# Patient Record
Sex: FEMALE | Race: OTHER | Employment: OTHER | URBAN - METROPOLITAN AREA
[De-identification: names, ages, dates, MRNs, and addresses within clinical notes are randomized per-mention and may not be internally consistent; named-entity substitution may affect disease eponyms.]

---

## 2018-11-02 ENCOUNTER — HOSPITAL ENCOUNTER (INPATIENT)
Facility: HOSPITAL | Age: 78
LOS: 5 days | Discharge: NURSING, REHAB OR LONG-TERM CARE | DRG: 470 | End: 2018-11-07
Attending: ORTHOPAEDIC SURGERY | Admitting: ORTHOPAEDIC SURGERY
Payer: MEDICARE

## 2018-11-02 ENCOUNTER — APPOINTMENT (INPATIENT)
Dept: RADIOLOGY | Facility: HOSPITAL | Age: 78
DRG: 470 | End: 2018-11-02
Payer: MEDICARE

## 2018-11-02 DIAGNOSIS — C16.9 MALIGNANT NEOPLASM OF STOMACH, UNSPECIFIED LOCATION (HCC): ICD-10-CM

## 2018-11-02 DIAGNOSIS — I48.0 PAF (PAROXYSMAL ATRIAL FIBRILLATION) (HCC): ICD-10-CM

## 2018-11-02 DIAGNOSIS — K59.00 CONSTIPATION: ICD-10-CM

## 2018-11-02 DIAGNOSIS — S72.002D FRACTURE OF LEFT HIP REQUIRING OPERATIVE REPAIR, CLOSED, WITH ROUTINE HEALING, SUBSEQUENT ENCOUNTER: Primary | ICD-10-CM

## 2018-11-02 DIAGNOSIS — I48.91 ATRIAL FIBRILLATION (HCC): ICD-10-CM

## 2018-11-02 PROBLEM — S72.002A FRACTURE, HIP, LEFT, CLOSED, INITIAL ENCOUNTER (HCC): Status: ACTIVE | Noted: 2018-11-02

## 2018-11-02 PROBLEM — N32.81 OVERACTIVE BLADDER: Status: ACTIVE | Noted: 2018-11-02
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
## 2018-11-02 PROBLEM — I10 ESSENTIAL HYPERTENSION: Status: ACTIVE | Noted: 2018-11-02

## 2018-11-02 PROBLEM — R32 URINARY INCONTINENCE: Status: ACTIVE | Noted: 2018-11-02

## 2018-11-02 LAB
ANION GAP SERPL CALCULATED.3IONS-SCNC: 8 MMOL/L (ref 4–13)
BUN SERPL-MCNC: 17 MG/DL (ref 5–25)
CALCIUM SERPL-MCNC: 9 MG/DL (ref 8.3–10.1)
CHLORIDE SERPL-SCNC: 105 MMOL/L (ref 100–108)
CK SERPL-CCNC: 110 U/L (ref 26–192)
CO2 SERPL-SCNC: 28 MMOL/L (ref 21–32)
CREAT SERPL-MCNC: 0.93 MG/DL (ref 0.6–1.3)
ERYTHROCYTE [DISTWIDTH] IN BLOOD BY AUTOMATED COUNT: 13.8 % (ref 11.6–15.1)
GFR SERPL CREATININE-BSD FRML MDRD: 59 ML/MIN/1.73SQ M
GLUCOSE SERPL-MCNC: 139 MG/DL (ref 65–140)
HCT VFR BLD AUTO: 43.5 % (ref 34.8–46.1)
HGB BLD-MCNC: 13.7 G/DL (ref 11.5–15.4)
INR PPP: 0.99 (ref 0.86–1.16)
MCH RBC QN AUTO: 30.6 PG (ref 26.8–34.3)
MCHC RBC AUTO-ENTMCNC: 31.5 G/DL (ref 31.4–37.4)
MCV RBC AUTO: 97 FL (ref 82–98)
PLATELET # BLD AUTO: 141 THOUSANDS/UL (ref 149–390)
PMV BLD AUTO: 9.5 FL (ref 8.9–12.7)
POTASSIUM SERPL-SCNC: 4.2 MMOL/L (ref 3.5–5.3)
PROTHROMBIN TIME: 10.4 SECONDS (ref 9.4–11.7)
RBC # BLD AUTO: 4.47 MILLION/UL (ref 3.81–5.12)
SODIUM SERPL-SCNC: 141 MMOL/L (ref 136–145)
WBC # BLD AUTO: 8.05 THOUSAND/UL (ref 4.31–10.16)

## 2018-11-02 PROCEDURE — 99221 1ST HOSP IP/OBS SF/LOW 40: CPT | Performed by: STUDENT IN AN ORGANIZED HEALTH CARE EDUCATION/TRAINING PROGRAM

## 2018-11-02 PROCEDURE — 82550 ASSAY OF CK (CPK): CPT | Performed by: STUDENT IN AN ORGANIZED HEALTH CARE EDUCATION/TRAINING PROGRAM

## 2018-11-02 PROCEDURE — 93005 ELECTROCARDIOGRAM TRACING: CPT | Performed by: STUDENT IN AN ORGANIZED HEALTH CARE EDUCATION/TRAINING PROGRAM

## 2018-11-02 PROCEDURE — 1124F ACP DISCUSS-NO DSCNMKR DOCD: CPT | Performed by: INTERNAL MEDICINE

## 2018-11-02 PROCEDURE — 85610 PROTHROMBIN TIME: CPT | Performed by: PHYSICIAN ASSISTANT

## 2018-11-02 PROCEDURE — 85027 COMPLETE CBC AUTOMATED: CPT | Performed by: PHYSICIAN ASSISTANT

## 2018-11-02 PROCEDURE — 93005 ELECTROCARDIOGRAM TRACING: CPT

## 2018-11-02 PROCEDURE — 87081 CULTURE SCREEN ONLY: CPT | Performed by: PHYSICIAN ASSISTANT

## 2018-11-02 PROCEDURE — 80048 BASIC METABOLIC PNL TOTAL CA: CPT | Performed by: PHYSICIAN ASSISTANT

## 2018-11-02 PROCEDURE — 71045 X-RAY EXAM CHEST 1 VIEW: CPT

## 2018-11-02 RX ORDER — OXYBUTYNIN CHLORIDE 5 MG/1
10 TABLET, EXTENDED RELEASE ORAL DAILY
Status: DISCONTINUED | OUTPATIENT
Start: 2018-11-03 | End: 2018-11-07 | Stop reason: HOSPADM

## 2018-11-02 RX ORDER — ACETAMINOPHEN 325 MG/1
650 TABLET ORAL EVERY 6 HOURS PRN
Status: DISCONTINUED | OUTPATIENT
Start: 2018-11-02 | End: 2018-11-07 | Stop reason: HOSPADM

## 2018-11-02 RX ORDER — SOLIFENACIN SUCCINATE 10 MG/1
TABLET, FILM COATED ORAL DAILY
COMMUNITY
End: 2021-06-21 | Stop reason: SDUPTHER

## 2018-11-02 RX ORDER — HEPARIN SODIUM 5000 [USP'U]/ML
5000 INJECTION, SOLUTION INTRAVENOUS; SUBCUTANEOUS EVERY 8 HOURS SCHEDULED
Status: DISCONTINUED | OUTPATIENT
Start: 2018-11-02 | End: 2018-11-03

## 2018-11-02 RX ORDER — DOCUSATE SODIUM 100 MG/1
100 CAPSULE, LIQUID FILLED ORAL 2 TIMES DAILY
Status: DISCONTINUED | OUTPATIENT
Start: 2018-11-02 | End: 2018-11-07 | Stop reason: HOSPADM

## 2018-11-02 RX ORDER — METOPROLOL SUCCINATE 50 MG/1
50 TABLET, EXTENDED RELEASE ORAL DAILY
Status: DISCONTINUED | OUTPATIENT
Start: 2018-11-03 | End: 2018-11-07 | Stop reason: HOSPADM

## 2018-11-02 RX ORDER — METOPROLOL SUCCINATE 50 MG/1
50 TABLET, EXTENDED RELEASE ORAL DAILY
COMMUNITY

## 2018-11-02 RX ORDER — ONDANSETRON 2 MG/ML
4 INJECTION INTRAMUSCULAR; INTRAVENOUS EVERY 6 HOURS PRN
Status: DISCONTINUED | OUTPATIENT
Start: 2018-11-02 | End: 2018-11-07 | Stop reason: HOSPADM

## 2018-11-02 RX ORDER — HYDROMORPHONE HCL/PF 1 MG/ML
1 SYRINGE (ML) INJECTION
Status: DISCONTINUED | OUTPATIENT
Start: 2018-11-02 | End: 2018-11-06

## 2018-11-02 RX ORDER — ACETAMINOPHEN 500 MG
500 TABLET ORAL EVERY 6 HOURS PRN
COMMUNITY

## 2018-11-02 RX ORDER — SODIUM CHLORIDE, SODIUM LACTATE, POTASSIUM CHLORIDE, CALCIUM CHLORIDE 600; 310; 30; 20 MG/100ML; MG/100ML; MG/100ML; MG/100ML
85 INJECTION, SOLUTION INTRAVENOUS CONTINUOUS
Status: DISCONTINUED | OUTPATIENT
Start: 2018-11-02 | End: 2018-11-03

## 2018-11-02 RX ADMIN — HEPARIN SODIUM 5000 UNITS: 5000 INJECTION, SOLUTION INTRAVENOUS; SUBCUTANEOUS at 21:06

## 2018-11-02 RX ADMIN — ACETAMINOPHEN 650 MG: 325 TABLET, FILM COATED ORAL at 21:06

## 2018-11-02 RX ADMIN — SODIUM CHLORIDE, SODIUM LACTATE, POTASSIUM CHLORIDE, AND CALCIUM CHLORIDE 85 ML/HR: .6; .31; .03; .02 INJECTION, SOLUTION INTRAVENOUS at 19:12

## 2018-11-02 RX ADMIN — DOCUSATE SODIUM 100 MG: 100 CAPSULE, LIQUID FILLED ORAL at 19:12

## 2018-11-02 NOTE — ASSESSMENT & PLAN NOTE
· Pt on metoprolol  · BP elevated on arrival, may be 2/2 pain  · Cont home med with holding parameters  · monitor

## 2018-11-02 NOTE — H&P
H&P-ORTHO      Patient: Sheyla Erickson   : 1940 Sex: female   MRN: [id number]     CSN: [id number]      History of Present Illness   HPI:  Sheyla Erickson is a 77 y o  female who presents with left hip s/p fall yesterday at home  She reports she missed the last step on her stairs, falling on her hip and buttock  She had an immediate onset of pain and was unable to walk  She was taken to [facility] where xrays showed a left hip subcapital femoral neck fracture  The patient has a long standing history with Dr Joyce Jones, including right TKA in 10/2016, and recent treatment for left knee OA with visco injections  She therefore opted to transfer to [place] so that he would be able to perform her surgery  She reports moderate pain at this time, worse with movement  No other orthopedic complaints  Review of Systems:   Constitutional:  Negative for activity change, fever, chills and diaphoresis  HENT: Negative for hearing loss and trouble swallowing  Eyes: Negative for visual disturbance  Respiratory: Negative for chest tightness and shortness of breath  Cardiovascular: Negative for chest pain  Gastrointestinal: Negative  abdominal pain, diarrhea, nausea and vomiting  Genitourinary: Negative for urinary issues   Neurological: Negative for dizziness and headaches         Historical Information   Past Medical History:   Diagnosis Date    Arthritis     Cancer of stomach ([address] ) 2013    s/p radiation     Hypertension     overactive bladder      Past Surgical History:   Procedure Laterality Date    APPENDECTOMY      REPLACEMENT TOTAL KNEE Right 2016     Social History   History   Alcohol Use No     History   Drug Use No     History   Smoking Status    Never Smoker   Smokeless Tobacco    Never Used     Family History:   Family History   Problem Relation Age of Onset    Cancer Maternal Grandfather        Meds/Allergies   Prescriptions Prior to Admission   Medication    acetaminophen (TYLENOL) 500 mg tablet    metoprolol succinate (TOPROL-XL) 50 mg 24 hr tablet    solifenacin (VESICARE) 10 MG tablet     No Known Allergies    Objective   Vitals: /76 (BP Location: Left arm)   Pulse 76   Temp 100 °F (37 8 °C) (Oral)   Resp 18   Ht 5' 4" (1 626 m)   SpO2 90%   Breastfeeding? No   BMI 24 03 kg/m²     Physical Exam:  General Alert awake and alert  Normocephalic atraumatic PERRLA  Lungs clear bilaterally  Cardiac normal S1 normal S2  Abdomen soft, nontender  ORTHO Left hip- moderate swelling, tenderness to palpation, skin intact  Left knee no tenderness of jointline,  Sensation intact  Calf soft and nontender  Palpable pulses, foot warm to touch    No intake/output data recorded  Invasive Devices     Peripheral Intravenous Line            Peripheral IV Right Hand -- days                    Lab Results: CBC:   Lab Results   Component Value Date    WBC 8 05 11/02/2018    HGB 13 7 11/02/2018    HCT 43 5 11/02/2018    MCV 97 11/02/2018     (L) 11/02/2018    MCH 30 6 11/02/2018    MCHC 31 5 11/02/2018    RDW 13 8 11/02/2018    MPV 9 5 11/02/2018           Assessment/ Plan:  Left hip femoral neck fracture ( xrays reviewed by Dr Renee Edmonds from [place])  That patient has been transferred to [facility] for continuity of care with her known orthopedic surgeon, Dr Krystal Mcintosh for surgery tomorrow, left hip bipolar   Risks and benefits were discussed with the patient and consent obtained by Dr Renee Edmonds  Currently the nursing team is trying to establish IV access  NPO after midnight, bedrest  Will hold sub q heparin tomorrow for surgery       Jeremie Byrd PA-C

## 2018-11-02 NOTE — ASSESSMENT & PLAN NOTE
· Hx of stomach ca s/p radiation in [place] in 2013  · Has not had follow up since   · Advised to follow up with her oncologist once acute issues resolve

## 2018-11-02 NOTE — CONSULTS
Inpatient Medical Consultation - [facility]    Patient Information: Ashley Ruiz 77 y o  female MRN: [id number]  Unit/Bed#: [address] [place] [address] Encounter: [id number]    PCP: No primary care provider on file  Date of Admission:  11/2/2018  Date of Consultation: 11/02/18  Requesting Physician: Loraine Unger MD      Consults    * Fracture, hip, left, closed, initial encounter [facility])   Assessment & Plan    · Patient transferred from [address] [place] [address] at her request to be operated on by Dr Tatiana Pal  · S/p mechanical fall down the stairs  · Left hip XR from OSH: Displaced, comminuted, subcapital fracture of the left femur  · Plan for OR tomorrow morning  · NPO at [facility]  · Pain control per primary team  · NWT, bedrest  · Post op PT/PT       Stomach cancer ([facility] [address] )   Assessment & Plan    · Hx of stomach ca s/p radiation in [place] in [place]  · Has not had follow up since   · Advised to follow up with her oncologist once acute issues resolve     Urinary incontinence   Assessment & Plan    · Substitute vesic are for formulary Ditropan     Essential hypertension   Assessment & Plan    · Pt on metoprolol  · BP elevated on arrival, may be 2/2 pain  · Cont home med with holding parameters  · monitor             Reason For Consultation:     Medical management    of Present Illness:    Ashley Ruiz is a 77 y o  female with a PMH of HTN, OA s/p right knee replacement, overactive bladder, stomach cancer s/p radiation in 2013  who is originally admitted to the Orthopedic service on 11/2/2018 due to left hip fracture  We are consulted for medical management  Yesterday patient was walking down the stairs and thought she was on the last step but was not so she lost her footing and fell, landing on her left side on a concrete floor  She could not get up because of pain and inability to bear weight  She laid on the floor for 2 5 hours until someone came home and called EMS   She was taken to [address] [place] [address] where she was admitted  She was planned to have surgery there but she requested to specifically have Dr Arlen Luna perform her surgery and this could not be done there so she was accepted by him and transferred here for treatment  Currently she has minimal pain if laying still, pain increases significantly if she is moved  Otherwise she has no complaints  She lives in a 2 story house with her , and she can care for for all her ADLs independently  She has never had an MI or stroke and has never been Dx with heart failure  She can walk up 2 flights of stairs without symptoms  She denies any chest pain, SOB, palpitations with exertion  Review of Systems:    Review of Systems   Constitutional: Negative for activity change, appetite change, chills, fatigue, fever and unexpected weight change  HENT: Negative  Eyes: Negative  Respiratory: Negative for cough, chest tightness, shortness of breath and wheezing  Cardiovascular: Negative for chest pain, palpitations and leg swelling  Gastrointestinal: Negative for abdominal pain, blood in stool, constipation, diarrhea, nausea and vomiting  Genitourinary: Negative for dysuria and flank pain  Hx urine incontinence   Musculoskeletal: Positive for arthralgias and gait problem  Skin: Negative for wound  Neurological: Negative for dizziness, seizures, syncope, weakness, light-headedness and headaches  Hematological: Negative  Psychiatric/Behavioral: Negative  All other systems reviewed and are negative  Past Medical and Surgical History:     Past Medical History:   Diagnosis Date    Arthritis     Cancer of stomach ([facility] [address] ) 2013    s/p radiation     Hypertension     Urinary incontinence        Past Surgical History:   Procedure Laterality Date    APPENDECTOMY      REPLACEMENT TOTAL KNEE Right 2016       Meds/Allergies:    PTA meds:   Prior to Admission Medications   Prescriptions Last Dose Informant Patient Reported?  Taking?   acetaminophen (TYLENOL) 500 mg tablet  Self Yes Yes   Sig: Take 500 mg by mouth every 6 (six) hours as needed for mild pain   metoprolol succinate (TOPROL-XL) 50 mg 24 hr tablet  Self Yes Yes   Sig: Take 50 mg by mouth daily   solifenacin (VESICARE) 10 MG tablet  Self Yes Yes   Sig: Take by mouth daily      Facility-Administered Medications: None       Allergies: No Known Allergies  History:     Marital Status: Unknown    Substance Use History:   History   Alcohol Use No     History   Smoking Status    Never Smoker   Smokeless Tobacco    Never Used     History   Drug Use No       Family History:    Family History   Problem Relation Age of Onset    Cancer Maternal Grandfather        Physical Exam:     Vitals:   Blood Pressure: 170/76 (11/02/18 1700)  Pulse: 76 (11/02/18 1700)  Temperature: 100 °F (37 8 °C) (11/02/18 1700)  Temp Source: Oral (11/02/18 1700)  Respirations: 18 (11/02/18 1700)  SpO2: 90 % (11/02/18 1700)    Physical Exam   Constitutional: She is oriented to person, place, and time  She appears well-developed  No distress  HENT:   Head: Normocephalic and atraumatic  Cardiovascular: Normal rate, regular rhythm and normal heart sounds  Pulmonary/Chest: Effort normal and breath sounds normal  No respiratory distress  She has no wheezes  She has no rales  Abdominal: Soft  Bowel sounds are normal  She exhibits no distension  There is no tenderness  There is no rebound and no guarding  Musculoskeletal: She exhibits no edema, tenderness or deformity  Neurological: She is alert and oriented to person, place, and time  Skin: Skin is warm and dry  Psychiatric: She has a normal mood and affect  Her behavior is normal    Nursing note and vitals reviewed  Lab Results: I Have Reviewed All Lab Data Below: Invalid input(s): LABALBU        * Additional Pertinent Lab Tests Reviewed: Labs Pending At The Time Of This Note    Imaging: I have personally reviewed pertinent reports        Outside hospital reports:  · Left hip XR: Displaced, comminuted, subcapital fracture of the left femur  · Left knee XR: severe OA which has progressed  Chondrocalcinosis suggesting CPPD  Probable bone infarcts within the distal left femur  · CXR:hyperinflated lungs  Probable scarring of left base      VTE Prophylaxis: Heparin  / sequential compression device       Counseling / Coordination of Care Time: 45 minutes  Greater than 50% of total time spent on patient counseling and coordination of care  Collaboration of Care:  Were Recommendations Directly Discussed with Primary Treatment Team? - Yes     ** Please Note: Dragon 360 Dictation speech to text software may have been used in the creation of this document **

## 2018-11-02 NOTE — ASSESSMENT & PLAN NOTE
· Patient transferred from [address] [place] [address] at her request to be operated on by Dr Estefani Esteban  · S/p mechanical fall down the stairs  · Left hip XR from OSH: Displaced, comminuted, subcapital fracture of the left femur  · Plan for OR tomorrow morning  · NPO at [facility]  · Pain control per primary team  · NWT, bedrest  · Pre op: CBC, BMP, PT/INR, CXR and EKG ordered  · Post op PT/PT

## 2018-11-03 ENCOUNTER — ANESTHESIA (INPATIENT)
Dept: PERIOP | Facility: HOSPITAL | Age: 78
DRG: 470 | End: 2018-11-03
Payer: MEDICARE

## 2018-11-03 ENCOUNTER — ANESTHESIA EVENT (INPATIENT)
Dept: PERIOP | Facility: HOSPITAL | Age: 78
DRG: 470 | End: 2018-11-03
Payer: MEDICARE

## 2018-11-03 ENCOUNTER — APPOINTMENT (INPATIENT)
Dept: NON INVASIVE DIAGNOSTICS | Facility: HOSPITAL | Age: 78
DRG: 470 | End: 2018-11-03
Payer: MEDICARE

## 2018-11-03 ENCOUNTER — APPOINTMENT (INPATIENT)
Dept: RADIOLOGY | Facility: HOSPITAL | Age: 78
DRG: 470 | End: 2018-11-03
Payer: MEDICARE

## 2018-11-03 PROBLEM — I48.0 PAF (PAROXYSMAL ATRIAL FIBRILLATION) (HCC): Status: ACTIVE | Noted: 2018-11-03

## 2018-11-03 PROBLEM — S72.002D: Status: ACTIVE | Noted: 2018-11-02

## 2018-11-03 PROCEDURE — 99232 SBSQ HOSP IP/OBS MODERATE 35: CPT | Performed by: STUDENT IN AN ORGANIZED HEALTH CARE EDUCATION/TRAINING PROGRAM

## 2018-11-03 PROCEDURE — C1776 JOINT DEVICE (IMPLANTABLE): HCPCS | Performed by: ORTHOPAEDIC SURGERY

## 2018-11-03 PROCEDURE — 93306 TTE W/DOPPLER COMPLETE: CPT | Performed by: INTERNAL MEDICINE

## 2018-11-03 PROCEDURE — 93306 TTE W/DOPPLER COMPLETE: CPT

## 2018-11-03 PROCEDURE — 99223 1ST HOSP IP/OBS HIGH 75: CPT | Performed by: INTERNAL MEDICINE

## 2018-11-03 PROCEDURE — 0SRS0J9 REPLACEMENT OF LEFT HIP JOINT, FEMORAL SURFACE WITH SYNTHETIC SUBSTITUTE, CEMENTED, OPEN APPROACH: ICD-10-PCS | Performed by: ORTHOPAEDIC SURGERY

## 2018-11-03 PROCEDURE — 73501 X-RAY EXAM HIP UNI 1 VIEW: CPT

## 2018-11-03 PROCEDURE — C1713 ANCHOR/SCREW BN/BN,TIS/BN: HCPCS | Performed by: ORTHOPAEDIC SURGERY

## 2018-11-03 DEVICE — CONQUEST FX FEMORAL COMPONENT SIZE 11
Type: IMPLANTABLE DEVICE | Site: HIP | Status: FUNCTIONAL
Brand: CONQUEST FX

## 2018-11-03 DEVICE — INVIS DISTAL CENTRALIZER SIZE 12MM
Type: IMPLANTABLE DEVICE | Site: HIP | Status: FUNCTIONAL
Brand: INVIS

## 2018-11-03 DEVICE — COBALT CHROME 12/14 TAPER FEMORAL                                    HEAD 28MM + 0: Type: IMPLANTABLE DEVICE | Site: HIP | Status: FUNCTIONAL

## 2018-11-03 DEVICE — TANDEM BIPOLAR COBALT CHROME 45MM                                    OUTER DIAMETER 28MM INNER DIAMETER
Type: IMPLANTABLE DEVICE | Site: HIP | Status: FUNCTIONAL
Brand: TANDEM

## 2018-11-03 DEVICE — VERSABOND AB 40 GRAMS FORMULATION 2
Type: IMPLANTABLE DEVICE | Site: HIP | Status: FUNCTIONAL
Brand: VERSABOND

## 2018-11-03 RX ORDER — ASPIRIN 81 MG/1
81 TABLET ORAL 2 TIMES DAILY
Status: DISCONTINUED | OUTPATIENT
Start: 2018-11-03 | End: 2018-11-07 | Stop reason: HOSPADM

## 2018-11-03 RX ORDER — ONDANSETRON 2 MG/ML
4 INJECTION INTRAMUSCULAR; INTRAVENOUS ONCE AS NEEDED
Status: DISCONTINUED | OUTPATIENT
Start: 2018-11-03 | End: 2018-11-06

## 2018-11-03 RX ORDER — KETOROLAC TROMETHAMINE 30 MG/ML
INJECTION, SOLUTION INTRAMUSCULAR; INTRAVENOUS AS NEEDED
Status: DISCONTINUED | OUTPATIENT
Start: 2018-11-03 | End: 2018-11-03 | Stop reason: SURG

## 2018-11-03 RX ORDER — SENNOSIDES 8.6 MG
1 TABLET ORAL
Status: DISCONTINUED | OUTPATIENT
Start: 2018-11-03 | End: 2018-11-07 | Stop reason: HOSPADM

## 2018-11-03 RX ORDER — DEXAMETHASONE SODIUM PHOSPHATE 4 MG/ML
INJECTION, SOLUTION INTRA-ARTICULAR; INTRALESIONAL; INTRAMUSCULAR; INTRAVENOUS; SOFT TISSUE AS NEEDED
Status: DISCONTINUED | OUTPATIENT
Start: 2018-11-03 | End: 2018-11-03 | Stop reason: SURG

## 2018-11-03 RX ORDER — GLYCOPYRROLATE 0.2 MG/ML
INJECTION INTRAMUSCULAR; INTRAVENOUS AS NEEDED
Status: DISCONTINUED | OUTPATIENT
Start: 2018-11-03 | End: 2018-11-03 | Stop reason: SURG

## 2018-11-03 RX ORDER — MAGNESIUM HYDROXIDE 1200 MG/15ML
LIQUID ORAL AS NEEDED
Status: DISCONTINUED | OUTPATIENT
Start: 2018-11-03 | End: 2018-11-03 | Stop reason: HOSPADM

## 2018-11-03 RX ORDER — OXYCODONE HYDROCHLORIDE AND ACETAMINOPHEN 5; 325 MG/1; MG/1
2 TABLET ORAL EVERY 4 HOURS PRN
Status: DISCONTINUED | OUTPATIENT
Start: 2018-11-03 | End: 2018-11-06

## 2018-11-03 RX ORDER — HYDROMORPHONE HYDROCHLORIDE 2 MG/ML
INJECTION, SOLUTION INTRAMUSCULAR; INTRAVENOUS; SUBCUTANEOUS AS NEEDED
Status: DISCONTINUED | OUTPATIENT
Start: 2018-11-03 | End: 2018-11-03 | Stop reason: SURG

## 2018-11-03 RX ORDER — OXYCODONE HYDROCHLORIDE AND ACETAMINOPHEN 5; 325 MG/1; MG/1
1 TABLET ORAL
Status: DISCONTINUED | OUTPATIENT
Start: 2018-11-03 | End: 2018-11-07 | Stop reason: HOSPADM

## 2018-11-03 RX ORDER — SODIUM CHLORIDE, SODIUM LACTATE, POTASSIUM CHLORIDE, CALCIUM CHLORIDE 600; 310; 30; 20 MG/100ML; MG/100ML; MG/100ML; MG/100ML
75 INJECTION, SOLUTION INTRAVENOUS CONTINUOUS
Status: DISCONTINUED | OUTPATIENT
Start: 2018-11-03 | End: 2018-11-04

## 2018-11-03 RX ORDER — EPHEDRINE SULFATE 50 MG/ML
INJECTION, SOLUTION INTRAVENOUS AS NEEDED
Status: DISCONTINUED | OUTPATIENT
Start: 2018-11-03 | End: 2018-11-03 | Stop reason: SURG

## 2018-11-03 RX ORDER — ONDANSETRON 2 MG/ML
INJECTION INTRAMUSCULAR; INTRAVENOUS AS NEEDED
Status: DISCONTINUED | OUTPATIENT
Start: 2018-11-03 | End: 2018-11-03 | Stop reason: SURG

## 2018-11-03 RX ORDER — FENTANYL CITRATE 50 UG/ML
INJECTION, SOLUTION INTRAMUSCULAR; INTRAVENOUS AS NEEDED
Status: DISCONTINUED | OUTPATIENT
Start: 2018-11-03 | End: 2018-11-03 | Stop reason: SURG

## 2018-11-03 RX ORDER — PROPOFOL 10 MG/ML
INJECTION, EMULSION INTRAVENOUS AS NEEDED
Status: DISCONTINUED | OUTPATIENT
Start: 2018-11-03 | End: 2018-11-03 | Stop reason: SURG

## 2018-11-03 RX ORDER — MAGNESIUM HYDROXIDE/ALUMINUM HYDROXICE/SIMETHICONE 120; 1200; 1200 MG/30ML; MG/30ML; MG/30ML
30 SUSPENSION ORAL EVERY 6 HOURS PRN
Status: DISCONTINUED | OUTPATIENT
Start: 2018-11-03 | End: 2018-11-07 | Stop reason: HOSPADM

## 2018-11-03 RX ORDER — FENTANYL CITRATE/PF 50 MCG/ML
25 SYRINGE (ML) INJECTION
Status: DISCONTINUED | OUTPATIENT
Start: 2018-11-03 | End: 2018-11-06

## 2018-11-03 RX ORDER — ROCURONIUM BROMIDE 10 MG/ML
INJECTION, SOLUTION INTRAVENOUS AS NEEDED
Status: DISCONTINUED | OUTPATIENT
Start: 2018-11-03 | End: 2018-11-03 | Stop reason: SURG

## 2018-11-03 RX ADMIN — ACETAMINOPHEN 650 MG: 325 TABLET, FILM COATED ORAL at 10:36

## 2018-11-03 RX ADMIN — HYDROMORPHONE HYDROCHLORIDE 0.5 MG: 2 INJECTION, SOLUTION INTRAMUSCULAR; INTRAVENOUS; SUBCUTANEOUS at 11:52

## 2018-11-03 RX ADMIN — KETOROLAC TROMETHAMINE 30 MG: 30 INJECTION, SOLUTION INTRAMUSCULAR at 12:30

## 2018-11-03 RX ADMIN — HYDROMORPHONE HYDROCHLORIDE 0.5 MG: 2 INJECTION, SOLUTION INTRAMUSCULAR; INTRAVENOUS; SUBCUTANEOUS at 11:59

## 2018-11-03 RX ADMIN — FENTANYL CITRATE 75 MCG: 50 INJECTION, SOLUTION INTRAMUSCULAR; INTRAVENOUS at 11:43

## 2018-11-03 RX ADMIN — SODIUM CHLORIDE, SODIUM LACTATE, POTASSIUM CHLORIDE, AND CALCIUM CHLORIDE: .6; .31; .03; .02 INJECTION, SOLUTION INTRAVENOUS at 11:36

## 2018-11-03 RX ADMIN — GLYCOPYRROLATE 0.4 MG: 0.2 INJECTION, SOLUTION INTRAMUSCULAR; INTRAVENOUS at 13:02

## 2018-11-03 RX ADMIN — SODIUM CHLORIDE, SODIUM LACTATE, POTASSIUM CHLORIDE, AND CALCIUM CHLORIDE 85 ML/HR: .6; .31; .03; .02 INJECTION, SOLUTION INTRAVENOUS at 06:17

## 2018-11-03 RX ADMIN — NEOSTIGMINE METHYLSULFATE 3 MG: 1 INJECTION, SOLUTION INTRAMUSCULAR; INTRAVENOUS; SUBCUTANEOUS at 13:02

## 2018-11-03 RX ADMIN — HYDROMORPHONE HYDROCHLORIDE 0.5 MG: 2 INJECTION, SOLUTION INTRAMUSCULAR; INTRAVENOUS; SUBCUTANEOUS at 12:18

## 2018-11-03 RX ADMIN — FENTANYL CITRATE 25 MCG: 50 INJECTION, SOLUTION INTRAMUSCULAR; INTRAVENOUS at 11:41

## 2018-11-03 RX ADMIN — EPHEDRINE SULFATE 5 MG: 50 INJECTION, SOLUTION INTRAMUSCULAR; INTRAVENOUS; SUBCUTANEOUS at 12:47

## 2018-11-03 RX ADMIN — CEFAZOLIN SODIUM 1000 MG: 1 SOLUTION INTRAVENOUS at 20:59

## 2018-11-03 RX ADMIN — ONDANSETRON 4 MG: 2 INJECTION INTRAMUSCULAR; INTRAVENOUS at 11:50

## 2018-11-03 RX ADMIN — CEFAZOLIN SODIUM 2000 MG: 2 SOLUTION INTRAVENOUS at 11:40

## 2018-11-03 RX ADMIN — ROCURONIUM BROMIDE 50 MG: 10 INJECTION INTRAVENOUS at 11:43

## 2018-11-03 RX ADMIN — ASPIRIN 81 MG: 81 TABLET, COATED ORAL at 17:21

## 2018-11-03 RX ADMIN — HYDROMORPHONE HYDROCHLORIDE 0.5 MG: 2 INJECTION, SOLUTION INTRAMUSCULAR; INTRAVENOUS; SUBCUTANEOUS at 12:29

## 2018-11-03 RX ADMIN — OXYBUTYNIN CHLORIDE 10 MG: 5 TABLET, EXTENDED RELEASE ORAL at 08:27

## 2018-11-03 RX ADMIN — PROPOFOL 150 MG: 10 INJECTION, EMULSION INTRAVENOUS at 11:43

## 2018-11-03 RX ADMIN — DOCUSATE SODIUM 100 MG: 100 CAPSULE, LIQUID FILLED ORAL at 08:32

## 2018-11-03 RX ADMIN — DOCUSATE SODIUM 100 MG: 100 CAPSULE, LIQUID FILLED ORAL at 17:22

## 2018-11-03 RX ADMIN — DEXAMETHASONE SODIUM PHOSPHATE 4 MG: 4 INJECTION, SOLUTION INTRA-ARTICULAR; INTRALESIONAL; INTRAMUSCULAR; INTRAVENOUS; SOFT TISSUE at 11:50

## 2018-11-03 NOTE — DISCHARGE INSTRUCTIONS
Hip hemiarthroplasty   AFTER YOU LEAVE:    Medicines:   · Pain medicine  may be given to take away or decrease pain  Do not wait until the pain is severe before you take your medicine  Pain medicine can make you dizzy or sleepy  Prevent falls by calling someone when you get out of bed or if you need help  · Antibiotics  - you will take these prior to all dental procedures in the future- call the office to get prescription  ·   Anticoagulants  are a type of blood thinner medicine that helps prevent blood clots  Clots can cause strokes, heart attacks, and death  These medicines may cause you to bleed or bruise more easily  Typically we will have you take Aspirin 81mg twice daily  You will take this until 6 weeks after surgery  Follow up with your healthcare provider as directed: You may need to return to have your wound checked and stitches, staples, or drain removed  Write down your questions so you remember to ask them during your visits       Self-care:   Change dressing daily until incision is dry  You may shower 4 days after surgery if the incision is dry- no soaking the incision  It there are staples or white steri-strips on the incision, they remain until follow up (10-14 days after surgery)  Physical therapy:  A physical therapist teaches you exercises to help improve movement and strength, and to decrease pain  Jeffery Nation will instruct you on hip precautions to help avoid dislocation (abductor pillow, no crossing legs, avoid bending over)  Contact your healthcare provider if:   · You have a fever over 101 5°F    · You have increased pain and swelling in your joint, even after you take pain medicine  · You have questions or concerns about your condition or care  Seek immediate care or call 911 if:   · You have chest pain when you take a deep breath or cough  You may cough up blood

## 2018-11-03 NOTE — PROGRESS NOTES
Progress Note - Ryan Rausch 1940, 77 y o  female MRN: [id number]    Unit/Bed#: [address] [place] [address] Encounter: [id number]    Primary Care Provider: No primary care provider on file  Date and time admitted to hospital: 11/2/2018  4:13 PM        * Fracture, hip, left, closed, initial encounter [facility])   Assessment & Plan    · Patient transferred from [address] [place] [address] at her request to be operated on by Dr Renee Edmonds  · S/p mechanical fall down the stairs  · Left hip XR from OSH: Displaced, comminuted, subcapital fracture of the left femur  · Plan for surgical repair today       PAF (paroxysmal atrial fibrillation) ([facility] [address] )   Assessment & Plan    · New onset, rate controlled  · Patient seen by cardiology  · Echo done, prelim results per cardio unremarkable, report pending  · Cleared for surgery     Fall down stairs   Assessment & Plan    · Mechanical, was down 2 5 hrs  · Patient without any preceding symptoms  · OSH imaging including  · Left hip XR: fracutre of left femur  · Left knee XRL: severe OA which has progressed  Chondrocalcinosis suggesting CPPD  Probable bone infarcts within the distal left femur  · CXR:hyperinflated lungs  Probable scarring of left base  · Creatinine and total CK wnl  · Post op PT/OT     Stomach cancer ([facility] [address] )   Assessment & Plan    · Hx of stomach ca s/p radiation in [place] in 2013  · Has not had follow up since   · Advised to follow up with her oncologist once acute issues resolve     Overactive bladder   Assessment & Plan    · Substitute vesic are for formulary Ditropan     Essential hypertension   Assessment & Plan    · Pt on metoprolol  · BP elevated on arrival, may be 2/2 pain, improving  · Cont home med with holding parameters  · monitor           VTE Pharmacologic Prophylaxis:   Pharmacologic: Heparin  Mechanical VTE Prophylaxis in Place: Yes    Patient Centered Rounds: I have performed bedside rounds with nursing staff today      Discussions with Specialists or Other Care Team Provider: Yes    Education and Discussions with Family / Patient:Yes    Time Spent for Care: 30 minutes  More than 50% of total time spent on counseling and coordination of care as described above  Current Length of Stay: 1 day(s)    Current Patient Status: Inpatient     Discharge Plan: pending    Code Status: Level 1 - Full Code      Subjective:   No complaints this morning  Denies hip pain  Has never had a hx of irregular rhythm that she knows of  Denies CP, SOB, palpitations  Overnight EKG apparently showed Afib (EKG image is in the process of being scanned, I could not find original report), tele monitor appears to have times of irregular rhythm between mostly NSR  Objective:       Vitals:   Temp (24hrs), Av 2 °F (37 3 °C), Min:98 5 °F (36 9 °C), Max:100 2 °F (37 9 °C)    Temp:  [98 5 °F (36 9 °C)-100 2 °F (37 9 °C)] 98 5 °F (36 9 °C)  HR:  [72-77] 72  Resp:  [16-18] 18  BP: (137-180)/(63-78) 180/78  SpO2:  [90 %-98 %] 98 %  Body mass index is 24 03 kg/m²  Input and Output Summary (last 24 hours): Intake/Output Summary (Last 24 hours) at 18 0940  Last data filed at 18 0406   Gross per 24 hour   Intake              900 ml   Output              625 ml   Net              275 ml       Physical Exam:     Physical Exam   Constitutional: She is oriented to person, place, and time  She appears well-developed  No distress  HENT:   Head: Normocephalic and atraumatic  Cardiovascular: Normal rate, regular rhythm and normal heart sounds  Pulmonary/Chest: Effort normal and breath sounds normal  No respiratory distress  She has no wheezes  She has no rales  Abdominal: Soft  Bowel sounds are normal  She exhibits no distension  There is no tenderness  There is no rebound and no guarding  Musculoskeletal: She exhibits no edema  In bucks traction   Neurological: She is alert and oriented to person, place, and time  Skin: Skin is warm and dry     Psychiatric: She has a normal mood and affect  Her behavior is normal    Nursing note and vitals reviewed  Additional Data:     Labs:      Results from last 7 days  Lab Units 11/02/18  1848   WBC Thousand/uL 8 05   HEMOGLOBIN g/dL 13 7   HEMATOCRIT % 43 5   PLATELETS Thousands/uL 141*       Results from last 7 days  Lab Units 11/02/18  1848   POTASSIUM mmol/L 4 2   CHLORIDE mmol/L 105   CO2 mmol/L 28   BUN mg/dL 17   CREATININE mg/dL 0 93   CALCIUM mg/dL 9 0       Results from last 7 days  Lab Units 11/02/18  1848   INR  0 99       * I Have Reviewed All Lab Data Listed Above  * Additional Pertinent Lab Tests Reviewed: All Labs Within Last 24 Hours Reviewed    Imaging:  Xr Chest Portable    Result Date: 11/2/2018  Narrative: CHEST INDICATION:   pre-op  COMPARISON:  None EXAM PERFORMED/VIEWS:  XR CHEST PORTABLE FINDINGS: Heart shadow appears unremarkable  Atherosclerotic vascular calcifications are noted  The lungs are clear  No pneumothorax or pleural effusion  Osseous structures appear within normal limits for patient age  Impression: No acute cardiopulmonary disease   Workstation performed: [id number]     Imaging Reports Reviewed by myself    Cultures:   Blood Culture: No results found for: BLOODCX  Urine Culture: No results found for: URINECX  Sputum Culture: No components found for: SPUTUMCX  Wound Culture: No results found for: WOUNDCULT    Last 24 Hours Medication List:     Current Facility-Administered Medications:  acetaminophen 650 mg Oral Q6H PRN Aicha Mario MD    docusate sodium 100 mg Oral BID Aicha Mario MD    heparin (porcine) 5,000 Units Subcutaneous [facility] Macey Blas PA-C    HYDROmorphone 1 mg Intravenous Q3H PRN Rahat Camilo PA-C    influenza vaccine 0 5 mL Intramuscular Prior to discharge Zulema Hayward MD    lactated ringers 85 mL/hr Intravenous Continuous Rahat Camilo PA-C Last Rate: 85 mL/hr (11/03/18 0617)   magnesium hydroxide 30 mL Oral Daily PRN Aicha Mario MD    metoprolol succinate 50 mg Oral Daily Bridgett Whitman Ganser, MD    ondansetron 4 mg Intravenous Q6H PRN Heber Cross PA-C    oxybutynin 10 mg Oral Daily Halle Alvarez MD    pneumococcal 13-valent conjugate vaccine 0 5 mL Intramuscular Prior to discharge Kitty Dee MD         Today, Patient Was Seen By: Halle Alvarez MD    ** Please Note: Dragon 360 Dictation voice to text software may have been used in the creation of this document   **

## 2018-11-03 NOTE — ASSESSMENT & PLAN NOTE
· New onset, rate controlled  · Patient seen by cardiology  · Echo done, prelim results per cardio unremarkable, report pending  · Cleared for surgery

## 2018-11-03 NOTE — ASSESSMENT & PLAN NOTE
· Patient transferred from [facility] at her request to be operated on by Dr Brie Lazaro  · S/p mechanical fall down the stairs  · Left hip XR from OSH: Displaced, comminuted, subcapital fracture of the left femur  · Plan for surgical repair today

## 2018-11-03 NOTE — ASSESSMENT & PLAN NOTE
· Mechanical, was down 2 5 hrs  · Patient without any preceding symptoms  · OSH imaging including  · Left hip XR: fracutre of left femur  · Left knee XRL: severe OA which has progressed  Chondrocalcinosis suggesting CPPD  Probable bone infarcts within the distal left femur  · CXR:hyperinflated lungs   Probable scarring of left base  · Creatinine and total CK wnl  · Post op PT/OT

## 2018-11-03 NOTE — PERIOPERATIVE NURSING NOTE
1316: pt suddenly dropped spo2 to 85% chin lift performed applied simple mask at 15L with rapid improvement to 100%

## 2018-11-03 NOTE — PERIOPERATIVE NURSING NOTE
Pt skin warm pink and dry awake, confused but able to follow commands  Pt placed on o2 via nasal cannula at 4l, maintaining spo2 above 95% with 4l in place

## 2018-11-03 NOTE — OP NOTE
OPERATIVE REPORT  PATIENT NAME: Lauri Price    :  1940  MRN: [id number]  Pt Location: [facility] OR ROOM 01    SURGERY DATE: 11/3/2018    Surgeon(s) and Role:     * Larence Nissen, MD - Primary     * Blaine Todd PA-C - Assisting    Preop Diagnosis:  Fracture of left hip requiring operative repair, closed, with routine healing, subsequent encounter [S72 002D]    Post-Op Diagnosis Codes:     * Fracture of left hip requiring operative repair, closed, with routine healing, subsequent encounter [S72 002D]    Procedure cemented bipolar endoprosthetic replacement left hip  Specimen(s):  None    Estimated Blood Loss:   150 cc    Drains:  No surgical drains  Urethral Catheter Double-lumen (Active)   Site Assessment Clean;Skin intact 11/3/2018  4:06 AM   Collection Container Standard drainage bag 11/3/2018  4:06 AM   Securement Method Securing device (Describe) 11/3/2018  4:06 AM   Output (mL) 250 mL 11/3/2018  4:06 AM   Number of days: 1       Anesthesia Type:   General    Operative Indications:  Fracture of left hip requiring operative repair, closed, with routine healing, subsequent encounter [S72 002D]      Operative Findings:  Displaced subcapital fracture left hip    Complications:   None    Procedure and Technique:  The patient was identified and general anesthetic was administered  The patient was placed in the left side up position and secured with the hip positioner axillary roll and appropriate padding and taping to protect all neurovascular structures  The left hip was prepped and draped in sterile fashion  A Kocher postero lateral approach to the hip was then made  The skin was incised with a scalpel and subcutaneous dissection was achieved with Bovie electrocautery  The fascia adali gluteal layer was divided with Bovie electrocautery and finger dissection  The Charnley retractor was inserted    The abductors were muscles were peeled off of the superior capsule and the piriformis and posterior capsule along with the short external rotators were dissected off the posterior femoral neck with the Bovie  The subcapital fracture was immediately visible and the hematoma was evacuated  The corkscrew device was used to remove the femoral head which was measured at 45 mm  A formal femoral neck cut was made with the saw in appropriate alignment and version  The box osteotome and Charnley canal finer were employed  Serial hand reaming to 12 mm was performed followed by broaching to 11 mm  The femoral trial was then articulated with a standard neck length 28 mm inside diameter 45 mm outside diameter bipolar trial   This was then reduced and found to have full free physiologic range of motion with no toggle in full extension no dislocation through the range of motion and clinically equal leg lengths as measured to the inferior poles of the patella a bilaterally  The trial was carefully dislocated with the assistance of a bone hook and removed the femoral canal was prepared with the cement restrictor followed by pulse lavage and insertion of the tampon suction device  Cement was mixed containing antibiotics due to the patient's age and medical history  The cement was introduced into the femoral canal and pressurized  The 11 mm bipolar stem was then inserted into the cement mantle impacted and excess cement was removed  After the cement hardened the pre constructed 28 mm inside diameter 45 mm outside diameter bipolar +0 was a inserted and impacted on the cleansed and dried Jimenez taper  The hip was then reduced and the range of motion dislocation and leg length parameters were identical as compared to the final trials  The wound was irrigated copiously with irrigant solution and drained of all fluid  The posterosuperior capsule and piriformis layers were then reattached to the abductor origin with figure-of-eight sutures of 5  Ethibond    The wound was irrigated once again and closure was achieved with figure-of-eight sutures of 1  Vicryl for the fascia adali gluteal layer inverted sutures of 2 0 Vicryl for the subcutaneous layer and staples for the skin  A sterile compressive dressing was applied  The patient was placed in an abduction orthosis rolled supine awakened from anesthesia and transferred to the PACU in stable and satisfactory condition       I was present for the entire procedure and A physician assistant was required during the procedure for retraction tissue handling,dissection and suturing    Patient Disposition:  PACU  and extubated and stable       SIGNATURE: Sharath Wells MD  DATE: November 3, 2018  TIME: 12:47 PM

## 2018-11-03 NOTE — PLAN OF CARE
CARDIOVASCULAR - ADULT     Maintains optimal cardiac output and hemodynamic stability Progressing     Absence of cardiac dysrhythmias or at baseline rhythm Progressing        DISCHARGE PLANNING     Discharge to home or other facility with appropriate resources Progressing        GENITOURINARY - ADULT     Maintains or returns to baseline urinary function Progressing     Absence of urinary retention Progressing     Urinary catheter remains patent Progressing        INFECTION - ADULT     Absence or prevention of progression during hospitalization Progressing        Knowledge Deficit     Patient/family/caregiver demonstrates understanding of disease process, treatment plan, medications, and discharge instructions Progressing        MUSCULOSKELETAL - ADULT     Maintain or return mobility to safest level of function Progressing     Maintain proper alignment of affected body part Progressing        PAIN - ADULT     Verbalizes/displays adequate comfort level or baseline comfort level Progressing        Potential for Falls     Patient will remain free of falls Progressing        Prexisting or High Potential for Compromised Skin Integrity     Skin integrity is maintained or improved Progressing        SAFETY ADULT     Maintain or return mobility status to optimal level Progressing     Patient will remain free of falls Progressing

## 2018-11-03 NOTE — ANESTHESIA PREPROCEDURE EVALUATION
Review of Systems/Medical History  Patient summary reviewed  Chart reviewed  No history of anesthetic complications     Cardiovascular  EKG reviewed, Exercise tolerance (METS): >4,  Hypertension , Dysrhythmias , atrial fibrillation,   Comment: Patient had an episode of new onset afib  Currently in NSR,Echo done tdy morning EF 60%, Cardiologist on board ,  Pulmonary       GI/Hepatic    GI malignancy (Cancer of stomach [facility]) s/p radiation  ),             Endo/Other     GYN       Hematology   Musculoskeletal    Arthritis     Neurology   Psychology           Physical Exam    Airway    Mallampati score: II  TM Distance: >3 FB  Neck ROM: full     Dental       Cardiovascular  Cardiovascular exam normal    Pulmonary  Pulmonary exam normal     Other Findings        Anesthesia Plan  ASA Score- 3     Anesthesia Type- general with ASA Monitors  Additional Monitors:   Airway Plan: ETT  Comment: Patient refused spinal anesthesia due to her past experience        Plan Factors-    Induction- intravenous  Postoperative Plan- Plan for postoperative opioid use  Informed Consent- Anesthetic plan and risks discussed with patient

## 2018-11-03 NOTE — ASSESSMENT & PLAN NOTE
· Pt on metoprolol  · BP elevated on arrival, may be 2/2 pain, improving  · Cont home med with holding parameters  · monitor

## 2018-11-03 NOTE — CONSULTS
Consultation - Cardiology   [facility]     Loan Triplett 77 y o  female MRN: [id number]  : 1940  Unit/Bed#: [address] [place] [address] Encounter: [id number]      Assessment & Plan   1  Status post fall with left hip fracture  Patient was transferred from [facility] at her request to be operated upon by Dr Shelli Gloria      2  PAF  Currently back in sinus rhythm  Patient denies any palpitations  She may have occult atrial fibrillation  Will start the patient on amiodarone  She will be given amiodarone 150 mg IV does and then p o  200 mg twice a day for about 2 weeks then 200 mg daily for about 2 weeks then 100 mg daily  2  Essential hypertension  Blood pressure is little bit elevated  Can use intraoperative beta-blocker  Continue metoprolol    4  History of stomach cancer status post radiation  5  Preoperative clearance  Patient has no clinical evidence of any heart failure or ischemia  She has decent exercise capacity  She is pretty independent her daily activities  Her echo shows normal LV systolic function and she is already back in sinus rhythm  I believe she  is at low to intermediate risk for the surgery as planned  She is in optimal condition for the surgery  There is no evidence of any acute ischemia, heart failure and significant valvular disease  If she developed very atrial fibrillation she can be started on IV amiodarone  This was discussed with anesthesia  As above  Case discussed with orthopedics team, anesthesia and nursing staff  Physician Requesting Consult: Phill Shea MD    Reason for Consult / Principal Problem:  Preoperative clearance    Inpatient consult to Cardiology  Consult performed by: [facility]  Consult ordered by: Kacie Melo          HPI: Loan Triplett is a 77y o  year old female who presents with his left hip fracture after fall    Patient has past medical history significant for hypertension, osteoarthritis status post right knee replacement surgery, history of stomach cancer status post radiation in 2013 who was admitted to Orthopedic service with the complaints about hip pain after fall and was found to have fracture  Overnight EKG shows atrial fibrillation hence cardiology consult was called  Patient is comfortably lying flat and was seen in preoperative area  She denies any chest pain any shortness of breath  She was decent Roseanne Viveros active before this and could easily climb 2-3 flights of stair  There is no history of MI or stents  No PND no orthopnea no leg edema no other significant issues  When I saw her she is also already back in sinus rhythm  Currently she is sinus rhythm with heart rate 72 beats per minute  Her echo was reviewed  She has normal LV systolic function with no significant valvular disease  Full report to follow  Case was discussed with anesthesia and Orthopedics  Review of Systems   Gastrointestinal:        History of stomach cancer   Musculoskeletal: Positive for arthralgias, back pain and gait problem  Hip pain  History of DJD   Psychiatric/Behavioral: The patient is nervous/anxious  All other systems reviewed and are negative        Historical Information   Past Medical History:   Diagnosis Date    Arthritis     Cancer of stomach ([facility] [address] ) 2013    s/p radiation     Hypertension     overactive bladder      Past Surgical History:   Procedure Laterality Date    APPENDECTOMY      REPLACEMENT TOTAL KNEE Right 2016     History   Alcohol Use No     History   Drug Use No     History   Smoking Status    Never Smoker   Smokeless Tobacco    Never Used     Family History:   Family History   Problem Relation Age of Onset    Cancer Maternal Grandfather        Meds/Allergies    PTA meds:    Prescriptions Prior to Admission   Medication    acetaminophen (TYLENOL) 500 mg tablet    metoprolol succinate (TOPROL-XL) 50 mg 24 hr tablet    solifenacin (VESICARE) 10 MG tablet      No Known Allergies    Current Facility-Administered Medications:     acetaminophen (TYLENOL) tablet 650 mg, 650 mg, Oral, Q6H PRN, Caitlin Arias MD, 650 mg at 11/03/18 1036    docusate sodium (COLACE) capsule 100 mg, 100 mg, Oral, BID, Caitlin Arias MD, 100 mg at 11/03/18 1876    heparin (porcine) subcutaneous injection 5,000 Units, 5,000 Units, Subcutaneous, Q8H [facility], Vadim Pool PA-C, 5,000 Units at 11/02/18 2106    HYDROmorphone (DILAUDID) injection 1 mg, 1 mg, Intravenous, Q3H PRN, Reynold Smith PA-C    influenza vaccine, high-dose (FLUZONE HIGH-DOSE) IM injection MICHELLE 0 5 mL, 0 5 mL, Intramuscular, Prior to discharge, Maru Hernandez MD    lactated ringers infusion, 85 mL/hr, Intravenous, Continuous, Reynold Smith PA-C, Last Rate: 85 mL/hr at 11/03/18 0617, 85 mL/hr at 11/03/18 0617    magnesium hydroxide (MILK OF MAGNESIA) 400 mg/5 mL oral suspension 30 mL, 30 mL, Oral, Daily PRN, Caitlin Arias MD    metoprolol succinate (TOPROL-XL) 24 hr tablet 50 mg, 50 mg, Oral, Daily, Caitlin Arias MD, 50 mg at 11/03/18 0828    ondansetron (ZOFRAN) injection 4 mg, 4 mg, Intravenous, Q6H PRN, Reynold Smith PA-C    oxybutynin (DITROPAN-XL) 24 hr tablet 10 mg, 10 mg, Oral, Daily, Caitlin Arias MD, 10 mg at 11/03/18 0827    pneumococcal 13-valent conjugate vaccine (PREVNAR-13) IM injection 0 5 mL, 0 5 mL, Intramuscular, Prior to discharge, Maru Hernandez MD    VTE Pharmacologic Prophylaxis:   Heparin    Objective:   Vitals: Blood pressure (!) 180/78, pulse 72, temperature 98 5 °F (36 9 °C), temperature source Oral, resp  rate 18, height 5' 4" (1 626 m), SpO2 98 %, not currently breastfeeding  Body mass index is 24 03 kg/m²    BP Readings from Last 3 Encounters:   11/03/18 (!) 180/78   11/02/18 137/63     Orthostatic Blood Pressures      Most Recent Value   Blood Pressure   180/78 filed at 11/03/2018 0700   Patient Position - Orthostatic VS  Lying filed at 11/03/2018 0700          Intake/Output Summary (Last 24 hours) at 11/03/18 1125  Last data filed at 11/03/18 0406   Gross per 24 hour   Intake              900 ml   Output              625 ml   Net              275 ml       Invasive Devices     Peripheral Intravenous Line            Peripheral IV 11/02/18 Right Arm less than 1 day          Drain            Urethral Catheter Double-lumen less than 1 day                  Physical Exam:   Physical Exam    Neurologic:  Alert & oriented x 3, no new focal deficits, Not in any acute distress, comfortably lying flat  Constitutional:  Well developed, well nourished, non-toxic appearance   Eyes:  Pupil equal and reacting to light, conjunctiva normal   HENT:  Atraumatic, oropharynx moist, Neck- normal range of motion, no tenderness, supple   Respiratory:  Bilateral air entry, mostly clear to auscultation  Cardiovascular: S1-S2 regular with a 2/6 ejection systolic murmur and S4 is present  GI:  Soft, nondistended, normal bowel sounds, nontender, no hepatosplenomegaly appreciated  Musculoskeletal:  No edema, no tenderness, no deformities     Skin:  Well hydrated, no rash   Lymphatic:  No lymphadenopathy noted   Extremities:  No edema and distal pulses are present    Labs:   Troponins:     Results from last 7 days  Lab Units 11/02/18  1848   CK TOTAL U/L 110       CBC with diff:     Results from last 7 days  Lab Units 11/02/18  1848   WBC Thousand/uL 8 05   HEMOGLOBIN g/dL 13 7   HEMATOCRIT % 43 5   MCV fL 97   PLATELETS Thousands/uL 141*   MCH pg 30 6   MCHC g/dL 31 5   RDW % 13 8   MPV fL 9 5       CMP:     Results from last 7 days  Lab Units 11/02/18  1848   POTASSIUM mmol/L 4 2   CHLORIDE mmol/L 105   CO2 mmol/L 28   BUN mg/dL 17   CREATININE mg/dL 0 93   CALCIUM mg/dL 9 0   EGFR ml/min/1 73sq m 59       Magnesium:     Coags:     Results from last 7 days  Lab Units 11/02/18  1848   INR  0 99         Imaging & Testing   Cardiac testing:       Prelim echo report shows normal LV systolic function, EF around 60%, mild TR with PA pressure around 40 mm of mercury  Mildly dilated left atrium  Grade 1 diastolic dysfunction  No other significant valvular disease  Imaging: I have personally reviewed pertinent reports  Xr Chest Portable    Result Date: 11/2/2018  Narrative: CHEST INDICATION:   pre-op  COMPARISON:  None EXAM PERFORMED/VIEWS:  XR CHEST PORTABLE FINDINGS: Heart shadow appears unremarkable  Atherosclerotic vascular calcifications are noted  The lungs are clear  No pneumothorax or pleural effusion  Osseous structures appear within normal limits for patient age  Impression: No acute cardiopulmonary disease  Workstation performed: [id number]     EKG/ Monitor: Personally reviewed  Patient has multiple EKG none last night  One EKG done at 8 o'clock last night shows atrial fibrillation heart rate 99 beats per minute  Repeat EKG shows heart rate 82 beats per minute still atrial fibrillation  Currently patient is in sinus rhythm with heart rate 72 beats per minute  Code Status: Level 1 - Full Code  Advance Directive and Living Will:      POLST:        Flash Cameron MD      "This note has been constructed using a voice recognition system  Therefore there may be syntax, spelling, and/or grammatical errors   Please call if you have any questions  "

## 2018-11-03 NOTE — PERIOPERATIVE NURSING NOTE
Spoke with Dr Tabatha Lane (cardiology) via phone  Md had mentioned during pre op hanging a  possible amiodarone drip while in PACU, no order noted for same, pt remains in SR heart rate 65-76  MD aware of vitals and order received not to hang amiodarone drip

## 2018-11-03 NOTE — PERIOPERATIVE NURSING NOTE
LR 1000ml bag infused/completed, per anesthesia (Dr Raúl Delaney) no additional IV fluids to be given in PACU

## 2018-11-03 NOTE — SOCIAL WORK
DASH discussion completed  Discussed goals of making sure pt's needs are met upon discharge, pt's preferences are taken into account, pt understands her health condition, medications and symptoms to watch for after returning home and pt is aware of any follow up appointments recommended by hospital physician  RACHELL spoke with pt  while he was waiting for pt to come out of surgery  Pt transferred to [address] from [place]  Per pt , pt lives with her and is generally independent with her care  Pt has DME from prior orthopedic sugery with cane, walker, and has handlebars on their toiletseats at home  Pt has no HHC at this time and is not sure if she had it in the past  He reports pt went to [facility] previously, he thinks was [facility]  He expects she will have to go to [facility] post op this time as well  He is not 100% sure where she will want to go, will hold off on referrals until pt seen by PT/Ot and pt is present to have this conversation    Pt  drives and she uses the [facility] in [place], [place]

## 2018-11-03 NOTE — PROGRESS NOTES
Called to room by RN - pre-operative EKG with rate controlled atrial fibrillation  Discussed with patient and family at bedside, she does have a cardiologist but has never been diagnosed with atrial fibrillation in the past   She is unsure of what tests she has had done in the past   Patient denies any chest pain, shortness of breath, dizziness, palpitations  At this point, will hold off any anticoagulation as patient is scheduled for the OR tomorrow at 11:00 a m     Will consult Cardiology and get an echo  Place patient on continuous telemetry  Ortho on-call updated on new incidental findings

## 2018-11-03 NOTE — PLAN OF CARE
Problem: DISCHARGE PLANNING - CARE MANAGEMENT  Goal: Discharge to post-acute care or home with appropriate resources  INTERVENTIONS:  - Conduct assessment to determine patient/family and health care team treatment goals, and need for post-acute services based on payer coverage, community resources, and patient preferences, and barriers to discharge  - Address psychosocial, clinical, and financial barriers to discharge as identified in assessment in conjunction with the patient/family and health care team  - Arrange appropriate level of post-acute services according to patient's   needs and preference and payer coverage in collaboration with the physician and health care team  - Communicate with and update the patient/family, physician, and health care team regarding progress on the discharge plan  - Arrange appropriate transportation to post-acute venues  To [facility] post op care     Outcome: Progressing

## 2018-11-04 LAB
ANION GAP SERPL CALCULATED.3IONS-SCNC: 8 MMOL/L (ref 4–13)
BUN SERPL-MCNC: 19 MG/DL (ref 5–25)
CALCIUM SERPL-MCNC: 8.6 MG/DL (ref 8.3–10.1)
CHLORIDE SERPL-SCNC: 106 MMOL/L (ref 100–108)
CO2 SERPL-SCNC: 27 MMOL/L (ref 21–32)
CREAT SERPL-MCNC: 0.92 MG/DL (ref 0.6–1.3)
ERYTHROCYTE [DISTWIDTH] IN BLOOD BY AUTOMATED COUNT: 13.8 % (ref 11.6–15.1)
GFR SERPL CREATININE-BSD FRML MDRD: 59 ML/MIN/1.73SQ M
GLUCOSE SERPL-MCNC: 108 MG/DL (ref 65–140)
HCT VFR BLD AUTO: 32.1 % (ref 34.8–46.1)
HGB BLD-MCNC: 9.8 G/DL (ref 11.5–15.4)
MAGNESIUM SERPL-MCNC: 1.7 MG/DL (ref 1.6–2.6)
MCH RBC QN AUTO: 30 PG (ref 26.8–34.3)
MCHC RBC AUTO-ENTMCNC: 30.5 G/DL (ref 31.4–37.4)
MCV RBC AUTO: 98 FL (ref 82–98)
MRSA NOSE QL CULT: NORMAL
PLATELET # BLD AUTO: 82 THOUSANDS/UL (ref 149–390)
PMV BLD AUTO: 10.2 FL (ref 8.9–12.7)
POTASSIUM SERPL-SCNC: 4.5 MMOL/L (ref 3.5–5.3)
RBC # BLD AUTO: 3.27 MILLION/UL (ref 3.81–5.12)
SODIUM SERPL-SCNC: 141 MMOL/L (ref 136–145)
WBC # BLD AUTO: 7.81 THOUSAND/UL (ref 4.31–10.16)

## 2018-11-04 PROCEDURE — G8978 MOBILITY CURRENT STATUS: HCPCS

## 2018-11-04 PROCEDURE — G8979 MOBILITY GOAL STATUS: HCPCS

## 2018-11-04 PROCEDURE — 90670 PCV13 VACCINE IM: CPT | Performed by: ORTHOPAEDIC SURGERY

## 2018-11-04 PROCEDURE — 83735 ASSAY OF MAGNESIUM: CPT | Performed by: STUDENT IN AN ORGANIZED HEALTH CARE EDUCATION/TRAINING PROGRAM

## 2018-11-04 PROCEDURE — 97110 THERAPEUTIC EXERCISES: CPT

## 2018-11-04 PROCEDURE — 85027 COMPLETE CBC AUTOMATED: CPT | Performed by: STUDENT IN AN ORGANIZED HEALTH CARE EDUCATION/TRAINING PROGRAM

## 2018-11-04 PROCEDURE — 99232 SBSQ HOSP IP/OBS MODERATE 35: CPT | Performed by: STUDENT IN AN ORGANIZED HEALTH CARE EDUCATION/TRAINING PROGRAM

## 2018-11-04 PROCEDURE — 99232 SBSQ HOSP IP/OBS MODERATE 35: CPT | Performed by: INTERNAL MEDICINE

## 2018-11-04 PROCEDURE — G0008 ADMIN INFLUENZA VIRUS VAC: HCPCS | Performed by: ORTHOPAEDIC SURGERY

## 2018-11-04 PROCEDURE — G0009 ADMIN PNEUMOCOCCAL VACCINE: HCPCS | Performed by: ORTHOPAEDIC SURGERY

## 2018-11-04 PROCEDURE — 80048 BASIC METABOLIC PNL TOTAL CA: CPT | Performed by: STUDENT IN AN ORGANIZED HEALTH CARE EDUCATION/TRAINING PROGRAM

## 2018-11-04 PROCEDURE — 90662 IIV NO PRSV INCREASED AG IM: CPT | Performed by: ORTHOPAEDIC SURGERY

## 2018-11-04 PROCEDURE — 97163 PT EVAL HIGH COMPLEX 45 MIN: CPT

## 2018-11-04 RX ORDER — AMIODARONE HYDROCHLORIDE 200 MG/1
200 TABLET ORAL
Status: DISCONTINUED | OUTPATIENT
Start: 2018-11-04 | End: 2018-11-07 | Stop reason: HOSPADM

## 2018-11-04 RX ORDER — SODIUM CHLORIDE, SODIUM LACTATE, POTASSIUM CHLORIDE, CALCIUM CHLORIDE 600; 310; 30; 20 MG/100ML; MG/100ML; MG/100ML; MG/100ML
50 INJECTION, SOLUTION INTRAVENOUS CONTINUOUS
Status: DISCONTINUED | OUTPATIENT
Start: 2018-11-04 | End: 2018-11-04

## 2018-11-04 RX ADMIN — INFLUENZA A VIRUS A/MICHIGAN/45/2015 X-275 (H1N1) ANTIGEN (FORMALDEHYDE INACTIVATED), INFLUENZA A VIRUS A/SINGAPORE/INFIMH-16-0019/2016 IVR-186 (H3N2) ANTIGEN (FORMALDEHYDE INACTIVATED), AND INFLUENZA B VIRUS B/MARYLAND/15/2016 BX-69A (A B/COLORADO/6/2017-LIKE VIRUS) ANTIGEN (FORMALDEHYDE INACTIVATED) 0.5 ML: 60; 60; 60 INJECTION, SUSPENSION INTRAMUSCULAR at 17:14

## 2018-11-04 RX ADMIN — METOPROLOL SUCCINATE 50 MG: 50 TABLET, EXTENDED RELEASE ORAL at 10:38

## 2018-11-04 RX ADMIN — CEFAZOLIN SODIUM 1000 MG: 1 SOLUTION INTRAVENOUS at 04:52

## 2018-11-04 RX ADMIN — DOCUSATE SODIUM 100 MG: 100 CAPSULE, LIQUID FILLED ORAL at 10:38

## 2018-11-04 RX ADMIN — DOCUSATE SODIUM 100 MG: 100 CAPSULE, LIQUID FILLED ORAL at 17:12

## 2018-11-04 RX ADMIN — PNEUMOCOCCAL 13-VALENT CONJUGATE VACCINE 0.5 ML: 2.2; 2.2; 2.2; 2.2; 2.2; 4.4; 2.2; 2.2; 2.2; 2.2; 2.2; 2.2; 2.2 INJECTION, SUSPENSION INTRAMUSCULAR at 17:13

## 2018-11-04 RX ADMIN — ASPIRIN 81 MG: 81 TABLET, COATED ORAL at 17:12

## 2018-11-04 RX ADMIN — AMIODARONE HYDROCHLORIDE 200 MG: 200 TABLET ORAL at 17:12

## 2018-11-04 RX ADMIN — OXYBUTYNIN CHLORIDE 10 MG: 5 TABLET, EXTENDED RELEASE ORAL at 10:38

## 2018-11-04 RX ADMIN — ASPIRIN 81 MG: 81 TABLET, COATED ORAL at 10:38

## 2018-11-04 NOTE — ASSESSMENT & PLAN NOTE
· New onset, noted on EKG prior to surgery  She was rate controlled at the time  Brief episodes noted preoperatively  Now back in NSR  · Patient seen by cardiology, for preop clearance and perioperative management  · Echo done, normal EF  · Started on amiodarone, remains in sinus rhythm  · Asymptomatic  · Discussed with Cardiology, amiodarone 200 mg daily for 1 month followed by 100 mg   · Patient was advised to follow up with primary cardiologist after discharge for further management and possible Holter monitoring    Discussed with the patient

## 2018-11-04 NOTE — PROGRESS NOTES
Progress Note - Ortho      Patient: Cameron Ruiz   : 1940 Sex: female   MRN: [id number]     CSN: [id number]  Unit/Bed#: [address] [place] [address]     SUBJECTIVE:   Pt is POD #1 s/p left hip hemiarthroplasty  Pain well controlled with medications  Denies CP, SOB, calf pain  Tolerating PT/OT- she was able to walk to bathroom and back without pain      Objective   Vitals: /66 (BP Location: Left arm)   Pulse 84   Temp 98 4 °F (36 9 °C) (Oral)   Resp 18   Ht 5' 4" (1 626 m)   Wt 65 8 kg (145 lb)   SpO2 99%   Breastfeeding? No   BMI 24 89 kg/m²     I/O last 24 hours: In: 801 [I V :801]  Out: 800 [Urine:800]      Physical Exam:   General Alert awake   Normocephalic atraumatic PERRLA  Lungs clear bilaterally  Cardiac normal S1 normal S2  ORTHO Exam: left hip dressing c/d/I, minimal sanguinous drainage  NVI  Negative vincent's bilaterally  Ankle ROM intact with good strength with dorsiflexion/plantarflexion bilaterally      Lab Results: CBC:   Lab Results   Component Value Date    WBC 7 81 2018    HGB 9 8 (L) 2018    HCT 32 1 (L) 2018    MCV 98 2018    PLT 82 (L) 2018    MCH 30 0 2018    MCHC 30 5 (L) 2018    RDW 13 8 2018    MPV 10 2 2018         Assessment/ Plan:  POD # 1 s/p left hip hemiarthroplasty  Pt/Ot- WBAT with hip precautions  DVT prophylaxis- ASA 81mg BID  BID and venodynes  Hgb- mild post op anemia; will monitor  Continue pain meds  She is progressing very well since surgery with minimal pain   Discussed rehab placement  Would anticipate she will be ready for discharge tomorrow          Mickie Santa PA-C

## 2018-11-04 NOTE — ASSESSMENT & PLAN NOTE
· S/p mechanical fall down the stairs   Patient transferred from [address] [place] [address] at her request to be operated on by Dr Warrick Merlin  · Left hip XR from OSH: Displaced, comminuted, subcapital fracture of the left femur  · S/p hip replacement, on 11/3  · Post op DVT prophylaxis   · PT/OT, discharge planning to short-term rehab as per primary team  · Follow up with Orthopedics

## 2018-11-04 NOTE — PHYSICAL THERAPY NOTE
PT EVALUATION       11/04/18 1025   Note Type   Note type Eval/Treat   Pain Assessment   Pain Assessment No/denies pain   Home Living   Type of [address] (bi-level, full flight to living area)   [address] [employer] [employer] Walker;Cane   Prior Function   Level of [place] Independent with ADLs and functional mobility   Lives With Spouse   ADL Assistance Independent   Falls in the last 6 months 1 to 4   Vocational Retired   Restrictions/Precautions   Wells Jaime Bearing Precautions Per Order Yes   LLE [place] Bearing Per Order WBAT   Braces or Orthoses (abduction pillow)   Other Precautions (posterior hip precautions)   General   Additional Pertinent History Pt is s/p fall with femur fx now 1 day s/p L hip hemiarthroplasty   Cognition   Overall Cognitive Status WFL   RLE Assessment   RLE Assessment WFL   LLE Assessment   LLE Assessment (loss of TKE, MMT hip 2/5, knee 3-/5, ankle 4/5)   Bed Mobility   Supine to Sit 3  Moderate assistance   Transfers   Sit to Stand 4  Minimal assistance   Stand to Sit 4  Minimal assistance   Stand pivot 4  Minimal assistance   Additional items (with walker)   Ambulation/Elevation   Gait pattern (L knee flexed due to L knee OA)   Gait Assistance 4  Minimal assist   Assistive Device Rolling walker   Distance 15 feet   Balance   Static Sitting Good   Dynamic Sitting Fair   Static Standing Fair   Dynamic Standing Fair   Assessment   Prognosis Good   Problem List Decreased strength;Decreased endurance; Impaired balance;Decreased mobility; Decreased range of motion;Orthopedic restrictions   Assessment Patient seen for Physical Therapy evaluation  Patient admitted with Fracture, hip, left, closed, initial encounter ([facility] [address] )  Comorbidities affecting patient's physical performance include: htn, afib    Personal factors affecting patient at time of initial evaluation include: lives in 2 story house, stairs to enter home, inability to ambulate household distances and inability to perform ADLS  Prior to admission, patient was independent with functional mobility without assistive device and independent with ADLS  Please find objective findings from Physical Therapy assessment regarding body systems outlined above with impairments and limitations including weakness, impaired balance, gait deviations, decreased activity tolerance, decreased functional mobility tolerance, fall risk and orthopedic restrictions  The Barthel Index was used as a functional outcome tool presenting with a score of 55 today indicating marked limitations of functional mobility and ADLS  Patient's clinical presentation is currently unstable as seen in patient's presentation of increased fall risk, new onset of impairment of functional mobility, decreased endurance and new onset of weakness  Pt would benefit from continued Physical Therapy treatment to address deficits as defined above and maximize level of functional mobility  As demonstrated by objective findings, the assigned level of complexity for this evaluation is high  Goals   Patient Goals "walk on my own"   STG Expiration Date 11/11/18   Short Term Goal #1 improve bed mobility to min assist, improve transfers with walker to supervision, improve ambulation with walker to min assist 30 feet   LTG Expiration Date 11/18/18   Long Term Goal #1 improve bed mobility to independent, improve transfers bed to chair to independent, improve ambulation with walker to mod I 75 feet, pt will go up/down 10 steps with supervision so pt can enter her home  Treatment Day 1   Plan   Treatment/Interventions ADL retraining;Functional transfer training;LE strengthening/ROM; Elevations; Therapeutic exercise;Gait training;Bed mobility; Equipment eval/education;Patient/family training   PT Frequency (1-2x/day)   Recommendation   Recommendation (STR vs home PT/24 hr assist)   Barthel Index   Feeding 10   Bathing 0   Grooming Score 5   Dressing Score 5   Bladder Score 10 Bowels Score 10   Toilet Use Score 5   Transfers (Bed/Chair) Score 10   Mobility (Level Surface) Score 0   Stairs Score 0   Barthel Index Score 54   Licensure   [place] License Number  Jeanmarie Burks PT [id number]       Time In:1010  Time Out:1025  Total Time: 15      S:  "I have to go to the bathroom"   O:  Commode placed over toilet to increase height and accommodate hip precautions  Min assist to stand from arm chair, min assist ambulation with walker x10 feet, min assist to get on /off commode and for standing balance to wipe, min assist ambulation back to chair x 15 feet  Reviewed posterior hip precautions  Pt had no pain meds prior to treatment yet did not c/o pain! A:  Pt with excellent tolerance to activity post op day 1 s/p L hip hemiarthroplasty  Pt's gait is limited by her chronic L knee pain/lack of TKE    P:  Continue PT    Mary West, PT [id number]

## 2018-11-04 NOTE — PROGRESS NOTES
Progress Note - Cardiology   [facility]     Keren Echeverria 77 y o  female MRN: [id number]  : 1940  Unit/Bed#: [address] [address] Encounter: [id number]    Assessment and Plan:   1  Status post fall with left hip fracture status post surgery  She is doing very well  She is able to ambulate      2  PAF  Currently back in sinus rhythm  Patient denies any palpitations  She may have occult atrial fibrillation  Will start the patient on amiodarone  Start amiodarone 200 mg daily for a month then 100 mg daily  She need to follow up with her cardiology after that  Discussed with patient and       2  Essential hypertension  Blood pressure now pretty well controlled     4  History of stomach cancer status post radiation  5  Postoperative anemia  Follow hemoglobin closely management as per Medicine Orthopedics  Plan  Continue current Rx  Discussed with patient and  about amiodarone  She would follow up with her own cardiologist  Echo shows she has a normal LV systolic function  Will see as needed    Subjective / Objective:   Patient seen and evaluated  No new complaints  Denies any chest pain  She is staying in sinus rhythm  She follows up with [facility] and will see them back  She status post left hip arthroplasty    Vitals: Blood pressure 126/58, pulse 84, temperature 97 9 °F (36 6 °C), temperature source Oral, resp  rate 18, height 5' 4" (1 626 m), weight 65 8 kg (145 lb), SpO2 96 %, not currently breastfeeding  Vitals:    18 0442   Weight: 65 8 kg (145 lb)     Body mass index is 24 89 kg/m²    BP Readings from Last 3 Encounters:   18 126/58   18 137/63     Orthostatic Blood Pressures      Most Recent Value   Blood Pressure  126/58 filed at 2018 0900   Patient Position - Orthostatic VS  Sitting filed at 2018 0900        I/O        07 -  07 07 -  07 -  07    I V  (mL/kg) 900 801 (12 2)     Total Intake(mL/kg) 900 801 (12 2)     Urine (mL/kg/hr) 625 800 (0 5)     Total Output 625 800      Net +275 +1             Unmeasured Urine Occurrence   201 x        Invasive Devices     Peripheral Intravenous Line            Peripheral IV 11/03/18 Right Arm less than 1 day                  Intake/Output Summary (Last 24 hours) at 11/04/18 1349  Last data filed at 11/04/18 0528   Gross per 24 hour   Intake                0 ml   Output              600 ml   Net             -600 ml         Physical Exam:   Physical Exam    Neurologic:  Alert & oriented x 3,  no focal deficits noted   Constitutional:  Well developed, well nourished,  With no acute distress  Eyes:  PERRL, conjunctiva normal   HENT:  Atraumatic, external ears normal, nose normal,   NECK: Normal range of motion, no tenderness, neck is supple , No JVP  Respiratory:  Bilateral air entry mostly clear to auscultation  Cardiovascular: S1-S2 regular with a 2/6 ejection systolic murmur  S4 is heard  GI:  Soft, nondistended, normal bowel sounds, nontender, no hepatosplenomegaly appreciated  Musculoskeletal:  No tenderness, no deformities      Extremities:  No edema  Psychiatric:  Speech and behavior appropriate             Medications/ Allergies:     Current Facility-Administered Medications:  acetaminophen 650 mg Oral Q6H PRN Navi Silvestre MD   aluminum-magnesium hydroxide-simethicone 30 mL Oral Q6H PRN Hellen Garcia PA-C   amiodarone 200 mg Oral Daily With Breakfast Navi Silvestre MD   aspirin 81 mg Oral BID Hellen Garcia PA-C   docusate sodium 100 mg Oral BID Navi Silvestre MD   fentaNYL 25 mcg Intravenous Q5 Min PRN Wilfredo Gaines MD   HYDROmorphone 1 mg Intravenous Q3H PRN Nahed Cruz PA-C   influenza vaccine 0 5 mL Intramuscular Prior to discharge Ulises Kohli MD   magnesium hydroxide 30 mL Oral Daily PRN Navi Silvestre MD   metoprolol succinate 50 mg Oral Daily Navi Silvestre MD   ondansetron 4 mg Intravenous Q6H PRN Nahed Cruz PA-C ondansetron 4 mg Intravenous Once PRN Carolyn Jim MD   oxybutynin 10 mg Oral Daily Sheldon Contreras MD   oxyCODONE-acetaminophen 1 tablet Oral Q3H PRN Schuyler Edge PA-C   oxyCODONE-acetaminophen 2 tablet Oral Q4H PRN Schuyler Edge PA-C   pneumococcal 13-valent conjugate vaccine 0 5 mL Intramuscular Prior to discharge Milagro Diaz MD   senna 1 tablet Oral HS PRN Schuyler Edge PA-C       acetaminophen 650 mg Q6H PRN   aluminum-magnesium hydroxide-simethicone 30 mL Q6H PRN   fentaNYL 25 mcg Q5 Min PRN   HYDROmorphone 1 mg Q3H PRN   influenza vaccine 0 5 mL Prior to discharge   magnesium hydroxide 30 mL Daily PRN   ondansetron 4 mg Q6H PRN   ondansetron 4 mg Once PRN   oxyCODONE-acetaminophen 1 tablet Q3H PRN   oxyCODONE-acetaminophen 2 tablet Q4H PRN   pneumococcal 13-valent conjugate vaccine 0 5 mL Prior to discharge   senna 1 tablet HS PRN     No Known Allergies        Labs:   Troponins:  Results from last 7 days  Lab Units 11/02/18  1848   CK TOTAL U/L 110       CBC with diff:  Results from last 7 days  Lab Units 11/04/18  0653 11/02/18  1848   WBC Thousand/uL 7 81 8 05   HEMOGLOBIN g/dL 9 8* 13 7   HEMATOCRIT % 32 1* 43 5   MCV fL 98 97   PLATELETS Thousands/uL 82* 141*   MCH pg 30 0 30 6   MCHC g/dL 30 5* 31 5   RDW % 13 8 13 8   MPV fL 10 2 9 5       CMP:  Results from last 7 days  Lab Units 11/04/18  0653 11/02/18  1848   SODIUM mmol/L 141 141   POTASSIUM mmol/L 4 5 4 2   CHLORIDE mmol/L 106 105   CO2 mmol/L 27 28   BUN mg/dL 19 17   CREATININE mg/dL 0 92 0 93   CALCIUM mg/dL 8 6 9 0   EGFR ml/min/1 73sq m 59 59       Magnesium:  Results from last 7 days  Lab Units 11/04/18  0653   MAGNESIUM mg/dL 1 7     Coags:  Results from last 7 days  Lab Units 11/02/18  1848   INR  0 99         Imaging & Testing   I have personally reviewed pertinent reports  Xr Chest Portable    Result Date: 11/2/2018  Narrative: CHEST INDICATION:   pre-op   COMPARISON:  None EXAM PERFORMED/VIEWS:  XR CHEST PORTABLE FINDINGS: Heart shadow appears unremarkable  Atherosclerotic vascular calcifications are noted  The lungs are clear  No pneumothorax or pleural effusion  Osseous structures appear within normal limits for patient age  Impression: No acute cardiopulmonary disease  Workstation performed: [id number]     Xr Hip/pelv 1 Vw Left If Performed    Result Date: 2018  Narrative: LEFT HIP INDICATION:   Left hip hemiarthroplasty    COMPARISON:  None VIEWS:  XR HIP/PELV 1 VW LEFT W PELVIS IF PERFORMED FINDINGS: Solitary portable radiograph of the left hip demonstrates expected postsurgical appearance of left hemiarthroplasty without evidence of hardware complication  Subcutaneous emphysema and stable alignment is noted  Impression: Expected portable radiographic appearance of left hemiarthroplasty  Workstation performed: [id number]        EKG / Monitor: Personally reviewed  Normal sinus rhythm  Cardiac testing:   Results for orders placed during the hospital encounter of 18   Echo complete with contrast if indicated    Narrative [address]  [address] [facility] [address]  [phone]    Transthoracic Echocardiogram  2D, M-mode, Doppler, and Color Doppler    Study date:  2018    Patient: Shani Leal  MR number: [id number]  Account number: [de-identified]  : 1940  Age: 77 years  Gender: Female  Status: Inpatient  Location: Bedside  Height: 64 in  Weight: 139 7 lb  BP: 144/ 63 mmHg    Indications: Pre-Op    Diagnoses: V72 84 - PREOP EXAM UNSPCF    Sonographer:  Sathya Ivey  Interpreting Physician:  [place]ish [place] MD  Primary Physician:  Leigh Cobb  Referring Physician:  JUDY Cobb  Group:  [facility]    SUMMARY    LEFT VENTRICLE:  Systolic function was vigorous  Ejection fraction was estimated in the range of 65 % to 70 % to be 70 %  There were no regional wall motion abnormalities    Wall thickness was mildly increased  There was mild concentric hypertrophy  LEFT ATRIUM:  The atrium was mildly dilated  MITRAL VALVE:  There was mild annular calcification  There was mild regurgitation  AORTIC VALVE:  There was trace regurgitation  TRICUSPID VALVE:  There was mild regurgitation  Estimated peak PA pressure was 35 mmHg  HISTORY: PRIOR HISTORY: Abnormal Electrocardiogram    PROCEDURE: The procedure was performed at the bedside  This was a stat study  The transthoracic approach was used  The study included complete 2D imaging, M-mode, complete spectral Doppler, and color Doppler  The heart rate was 73 bpm, at  the start of the study  Image quality was adequate  LEFT VENTRICLE: Size was normal  Systolic function was vigorous  Ejection fraction was estimated in the range of 65 % to 70 % to be 70 %  There were no regional wall motion abnormalities  Wall thickness was mildly increased  There was mild  concentric hypertrophy  DOPPLER: Left ventricular diastolic function parameters were normal for the patient's age  RIGHT VENTRICLE: The size was normal  Systolic function was normal     LEFT ATRIUM: The atrium was mildly dilated  RIGHT ATRIUM: Size was normal     MITRAL VALVE: There was mild annular calcification  DOPPLER: There was no evidence for stenosis  There was mild regurgitation  AORTIC VALVE: The valve was trileaflet  Leaflets exhibited mildly increased thickness, mild calcification, and normal cuspal separation  DOPPLER: Transaortic velocity was minimally increased  There was no evidence for stenosis  There was  trace regurgitation  TRICUSPID VALVE: DOPPLER: There was mild regurgitation  Estimated peak PA pressure was 35 mmHg  PULMONIC VALVE: DOPPLER: There was no significant regurgitation  PERICARDIUM: There was no thickening or calcification  There was no pericardial effusion  AORTA: The root exhibited normal size      SYSTEMIC VEINS: HEPATIC VEINS: The hepatic veins were not well visualized  SYSTEM MEASUREMENT TABLES    2D mode  AoR Diam 2D: 2 2 cm  LA Diam (2D): 2 7 cm  LA/Ao (2D): 1 23  FS (2D Teich): -8 75 %  IVSd (2D): 1 23 cm  LVDEV: 34 2 cm³  LVESV: 41 9 cm³  LVIDd(2D): 2 97 cm  LVISd (2D): 3 23 cm  LVOT Area 2D: 2 27 cm squared  LVPWd (2D): 1 07 cm  SV (Teich): -7 7 cm³    Apical four chamber  LVEF A4C: 79 %    Unspecified Scan Mode  SUSHIL Cont Eq (Peak [place]): 1 19 cm squared  SUSHIL Cont Eq (VTI): 1 15 cm squared  LVOT (VTI): 24 5 cm  LVOT Diam : 1 7 cm  LVOT Vmax: 978 mm/s  LVOT Vmax; Mean: 978 mm/s  Peak Grad ; Mean: 4 mm[Hg]  SV (LVOT): 56 cm³  VTI;Mean: 2 mm[Hg]  MV Peak A [place]: 597 mm/s  MV Peak E [place]  Mean: 824 mm/s  RVSP: 29 mm[Hg]  Max P mm[Hg]  V Max: 2580 mm/s  Vmax: 2550 mm/s  TAPSE: 2 2 cm    [employer] Accredited Echocardiography Laboratory    Prepared and electronically signed by    Amanda Tenorio MD  Signed [id number] 16:52:19             Dr Amanda Tenorio MD [facility]      "This note has been constructed using a voice recognition system  Therefore there may be syntax, spelling, and/or grammatical errors   Please call if you have any questions  "

## 2018-11-04 NOTE — SOCIAL WORK
CM spoke with the pt at the bedside  Pt noted that she thinks that the last time she had rehab it was at one of the [employer], did not think it was in [place], she does think that she wants to try one of the [facility] this time around though  CM provided a list of providers in the [facility] and [facility] region and advised her of the poss  That she may be on the MBP come tomorrow  Reviewed the preferred providers as well as the CMS rating scores  Pt wants to speak with her  re: the choices and update DM or SW either today or tomorrow  MBP letter also given in case of addtn to the MBP listing

## 2018-11-04 NOTE — PROGRESS NOTES
Progress Note - Eric Gilliam 1940, 77 y o  female MRN: [id number]    Unit/Bed#: [address] [place] [address] Encounter: [id number]    Primary Care Provider: No primary care provider on file  Date and time admitted to hospital: 11/2/2018  4:13 PM        * Fracture, hip, left, closed, initial encounter [facility])   Assessment & Plan    · S/p mechanical fall down the stairs  Patient transferred from [address] [place] [address] at her request to be operated on by Dr Greg Cohn  · Left hip XR from OSH: Displaced, comminuted, subcapital fracture of the left femur  · S/p hip replacement 11/3  · Post op DVT prophylaxis per primary team  · PT/OT  · Placement pending patient progress       PAF (paroxysmal atrial fibrillation) ([facility] [address] )   Assessment & Plan    · New onset, noted on EKG prior to surgery  rate controlled  · Patient seen by cardiology, cleared for OR  · Echo done, results as noted  · Follow up with cardio      Fall down stairs   Assessment & Plan    · Mechanical, was down 2 5 hrs  · Patient without any preceding symptoms  · OSH imaging including  · Left hip XR: fracutre of left femur  · Left knee XRL: severe OA which has progressed  Chondrocalcinosis suggesting CPPD  Probable bone infarcts within the distal left femur  · CXR:hyperinflated lungs  Probable scarring of left base  · Creatinine and total CK wnl  · Post op PT/OT     Stomach cancer [facility])   Assessment & Plan    · Hx of stomach ca s/p radiation in [place] in 2013  · Has not had follow up since   · Advised to follow up with her oncologist once acute issues resolve     Overactive bladder   Assessment & Plan    · Substitute vesic are for formulary Ditropan     Essential hypertension   Assessment & Plan    · Pt on metoprolol  · BP elevated on arrival, may be 2/2 pain, improving  · Cont metoprolol  · Per cardio start amiodarone 150 mg IV does and then p o  200 mg twice a day for about 2 weeks then 200 mg daily for about 2 weeks then 100 mg daily             VTE Pharmacologic Prophylaxis:   Pharmacologic: per ortho team  Mechanical VTE Prophylaxis in Place: Yes    Patient Centered Rounds: I have performed bedside rounds with nursing staff today  Discussions with Specialists or Other Care Team Provider: Yes    Education and Discussions with Family / Patient:Yes    Time Spent for Care: 30 minutes  More than 50% of total time spent on counseling and coordination of care as described above  Current Length of Stay: 2 day(s)    Current Patient Status: Inpatient     Discharge Plan: pending    Code Status: Level 1 - Full Code      Subjective:   Denies complaints this morning  No pain in the hip, has been up on the edge of the bed, about to work with PT today  No CP, SOB, palpitations      Objective:       Vitals:   Temp (24hrs), Av 6 °F (36 4 °C), Min:97 3 °F (36 3 °C), Max:97 9 °F (36 6 °C)    Temp:  [97 3 °F (36 3 °C)-97 9 °F (36 6 °C)] 97 9 °F (36 6 °C)  HR:  [53-84] 84  Resp:  [10-18] 18  BP: (126-145)/(58-76) 126/58  SpO2:  [92 %-100 %] 96 %  Body mass index is 24 89 kg/m²  Input and Output Summary (last 24 hours): Intake/Output Summary (Last 24 hours) at 18 1128  Last data filed at 18 1375   Gross per 24 hour   Intake              801 ml   Output              800 ml   Net                1 ml       Physical Exam:     Physical Exam   Constitutional: She is oriented to person, place, and time  She appears well-developed  No distress  HENT:   Head: Normocephalic and atraumatic  Cardiovascular: Normal rate, regular rhythm and normal heart sounds  Pulmonary/Chest: Effort normal and breath sounds normal  No respiratory distress  She has no wheezes  She has no rales  Abdominal: Soft  Bowel sounds are normal  She exhibits no distension  There is no tenderness  There is no rebound and no guarding  Musculoskeletal: She exhibits no edema  Left hip with dressing C/D, compartment soft and warm   Minimal TTP   Neurological: She is alert and oriented to person, place, and time  Skin: Skin is warm and dry  Psychiatric: She has a normal mood and affect  Her behavior is normal    Nursing note and vitals reviewed  Additional Data:     Labs:      Results from last 7 days  Lab Units 11/04/18  0653   WBC Thousand/uL 7 81   HEMOGLOBIN g/dL 9 8*   HEMATOCRIT % 32 1*   PLATELETS Thousands/uL 82*       Results from last 7 days  Lab Units 11/04/18  0653   POTASSIUM mmol/L 4 5   CHLORIDE mmol/L 106   CO2 mmol/L 27   BUN mg/dL 19   CREATININE mg/dL 0 92   CALCIUM mg/dL 8 6       Results from last 7 days  Lab Units 11/02/18  1848   INR  0 99       * I Have Reviewed All Lab Data Listed Above  * Additional Pertinent Lab Tests Reviewed: All Labs Within Last 24 Hours Reviewed    Imaging:  Xr Chest Portable    Result Date: 11/2/2018  Narrative: CHEST INDICATION:   pre-op  COMPARISON:  None EXAM PERFORMED/VIEWS:  XR CHEST PORTABLE FINDINGS: Heart shadow appears unremarkable  Atherosclerotic vascular calcifications are noted  The lungs are clear  No pneumothorax or pleural effusion  Osseous structures appear within normal limits for patient age  Impression: No acute cardiopulmonary disease   Workstation performed: [id number]     Imaging Reports Reviewed by myself    Cultures:   Blood Culture: No results found for: BLOODCX  Urine Culture: No results found for: URINECX  Sputum Culture: No components found for: SPUTUMCX  Wound Culture: No results found for: WOUNDCULT    Last 24 Hours Medication List:     Current Facility-Administered Medications:  acetaminophen 650 mg Oral Q6H PRN Leonardo Gardner MD    aluminum-magnesium hydroxide-simethicone 30 mL Oral Q6H PRN Mandy Turcios PA-C    aspirin 81 mg Oral BID Mandy Turcios PA-C    docusate sodium 100 mg Oral BID Leonardo Gardner MD    fentaNYL 25 mcg Intravenous Q5 Min PRN Katie Bass MD    HYDROmorphone 1 mg Intravenous Q3H PRN Mohit Gaines PA-C    influenza vaccine 0 5 mL Intramuscular Prior to discharge Juan Barber MD lactated ringers 50 mL/hr Intravenous Continuous Patti Mcgee PA-C    lactated ringers 75 mL/hr Intravenous Continuous Patti Mcgee PA-C Last Rate: Stopped (11/04/18 1005)   magnesium hydroxide 30 mL Oral Daily PRN Yandy Moran MD    metoprolol succinate 50 mg Oral Daily Yandy Moran MD    ondansetron 4 mg Intravenous Q6H PRN Dorothy Castaneda PA-C    ondansetron 4 mg Intravenous Once PRN Wayne Bagley MD    oxybutynin 10 mg Oral Daily Yandy Moran MD    oxyCODONE-acetaminophen 1 tablet Oral Q3H PRN Patti Mcgee PA-C    oxyCODONE-acetaminophen 2 tablet Oral Q4H PRN Patti Mcgee PA-C    pneumococcal 13-valent conjugate vaccine 0 5 mL Intramuscular Prior to discharge Timoteo Mosley MD    senna 1 tablet Oral HS PRN Patti Mcgee PA-C         Today, Patient Was Seen By: Yandy Moran MD    ** Please Note: Dragon 360 Dictation voice to text software may have been used in the creation of this document   **

## 2018-11-05 LAB
ANION GAP SERPL CALCULATED.3IONS-SCNC: 8 MMOL/L (ref 4–13)
BUN SERPL-MCNC: 19 MG/DL (ref 5–25)
CALCIUM SERPL-MCNC: 8.5 MG/DL (ref 8.3–10.1)
CHLORIDE SERPL-SCNC: 108 MMOL/L (ref 100–108)
CO2 SERPL-SCNC: 27 MMOL/L (ref 21–32)
CREAT SERPL-MCNC: 0.88 MG/DL (ref 0.6–1.3)
ERYTHROCYTE [DISTWIDTH] IN BLOOD BY AUTOMATED COUNT: 14 % (ref 11.6–15.1)
GFR SERPL CREATININE-BSD FRML MDRD: 63 ML/MIN/1.73SQ M
GLUCOSE SERPL-MCNC: 93 MG/DL (ref 65–140)
HCT VFR BLD AUTO: 32 % (ref 34.8–46.1)
HGB BLD-MCNC: 10 G/DL (ref 11.5–15.4)
MCH RBC QN AUTO: 30.4 PG (ref 26.8–34.3)
MCHC RBC AUTO-ENTMCNC: 31.3 G/DL (ref 31.4–37.4)
MCV RBC AUTO: 97 FL (ref 82–98)
PLATELET # BLD AUTO: 86 THOUSANDS/UL (ref 149–390)
PMV BLD AUTO: 10.3 FL (ref 8.9–12.7)
POTASSIUM SERPL-SCNC: 4.2 MMOL/L (ref 3.5–5.3)
RBC # BLD AUTO: 3.29 MILLION/UL (ref 3.81–5.12)
SODIUM SERPL-SCNC: 143 MMOL/L (ref 136–145)
WBC # BLD AUTO: 6.5 THOUSAND/UL (ref 4.31–10.16)

## 2018-11-05 PROCEDURE — 97535 SELF CARE MNGMENT TRAINING: CPT

## 2018-11-05 PROCEDURE — 85027 COMPLETE CBC AUTOMATED: CPT | Performed by: STUDENT IN AN ORGANIZED HEALTH CARE EDUCATION/TRAINING PROGRAM

## 2018-11-05 PROCEDURE — 97110 THERAPEUTIC EXERCISES: CPT

## 2018-11-05 PROCEDURE — 99231 SBSQ HOSP IP/OBS SF/LOW 25: CPT | Performed by: STUDENT IN AN ORGANIZED HEALTH CARE EDUCATION/TRAINING PROGRAM

## 2018-11-05 PROCEDURE — 97167 OT EVAL HIGH COMPLEX 60 MIN: CPT

## 2018-11-05 PROCEDURE — 80048 BASIC METABOLIC PNL TOTAL CA: CPT | Performed by: STUDENT IN AN ORGANIZED HEALTH CARE EDUCATION/TRAINING PROGRAM

## 2018-11-05 PROCEDURE — 97116 GAIT TRAINING THERAPY: CPT

## 2018-11-05 PROCEDURE — G8988 SELF CARE GOAL STATUS: HCPCS

## 2018-11-05 PROCEDURE — G8987 SELF CARE CURRENT STATUS: HCPCS

## 2018-11-05 RX ADMIN — AMIODARONE HYDROCHLORIDE 200 MG: 200 TABLET ORAL at 09:43

## 2018-11-05 RX ADMIN — OXYBUTYNIN CHLORIDE 10 MG: 5 TABLET, EXTENDED RELEASE ORAL at 09:41

## 2018-11-05 RX ADMIN — METOPROLOL SUCCINATE 50 MG: 50 TABLET, EXTENDED RELEASE ORAL at 09:41

## 2018-11-05 RX ADMIN — DOCUSATE SODIUM 100 MG: 100 CAPSULE, LIQUID FILLED ORAL at 17:06

## 2018-11-05 RX ADMIN — ASPIRIN 81 MG: 81 TABLET, COATED ORAL at 09:41

## 2018-11-05 RX ADMIN — DOCUSATE SODIUM 100 MG: 100 CAPSULE, LIQUID FILLED ORAL at 09:41

## 2018-11-05 RX ADMIN — ASPIRIN 81 MG: 81 TABLET, COATED ORAL at 17:06

## 2018-11-05 NOTE — OCCUPATIONAL THERAPY NOTE
Occupational Therapy Evaluation/Treatment     11/05/18 0955   Note Type   Note type Eval/Treat   Restrictions/Precautions   LLE Weight Bearing Per Order WBAT   Braces or Orthoses (abduction pillow)   Other Precautions Chair Alarm; Bed Alarm; Fall Risk  (posterior hip precautions)   Pain Assessment   Pain Assessment Anderson-Baker FACES   Anderson-Baker FACES Pain Rating 4   Pain Location Hip;Leg   Pain Orientation Left   Home Living   Type of Home House  (bi-level)   Home Layout Two level   Bathroom Shower/Tub Tub/shower unit   Bathroom Equipment Commode   Home Equipment Walker;Cane   Additional Comments pt mostly sponge bathes    Prior Function   Level of [place] Independent with ADLs and functional mobility   Lives With Spouse   Receives Help From Family   ADL Assistance Independent   IADLs Independent   Falls in the last 6 months 1 to 4   Comments pt drives    ADL   Eating Assistance 5  Supervision/Setup   Grooming Assistance 5  Supervision/Setup   UB Bathing Assistance 4  Minimal Assistance   LB Bathing Assistance 3  Moderate Assistance   UB Dressing Assistance 4  Minimal Assistance   LB Dressing Assistance 3  Moderate Assistance   Toileting Assistance  4  Minimal Assistance   Bed Mobility   Supine to Sit 3  Moderate assistance   Additional items Verbal cues   Additional Comments pt is slow to respond at times, reports being tired   Transfers   Sit to Stand 4  Minimal assistance   Additional items Verbal cues   Stand to Sit 4  Minimal assistance   Additional items Verbal cues   Stand pivot 4  Minimal assistance   Additional items Verbal cues   Additional Comments pt able to recll 2 out of 3 hip precautions independently, reviewed 3rd   Functional Mobility   Functional Mobility 4  Minimal assistance   Additional Comments 15 feet    Additional items Rolling walker  (verbal cues for safety )   Balance   Static Sitting Fair +   Dynamic Sitting Fair   Static Standing Fair   Dynamic Standing Fair -   Activity Tolerance Activity Tolerance Patient limited by fatigue;Patient limited by pain   Nurse Made Aware yes, Yamilex MOON Assessment   RUE Assessment WFL  (4-/5)   LUE Assessment   LUE Assessment WFL  (4-/5)   Sensation   Light Touch No apparent deficits   Cognition   Overall Cognitive Status WFL   Arousal/Participation Cooperative   Attention Attends with cues to redirect   Orientation Level Oriented X4   Following Commands Follows all commands and directions without difficulty   Comments slow to respond   Assessment   Limitation Decreased ADL status; Decreased UE strength;Decreased Safe judgement during ADL;Decreased endurance;Decreased self-care trans;Decreased high-level ADLs  (decreased balance and mobility )   Prognosis Good   Assessment Patient evaluated by Occupational Therapy  Patient admitted with Fracture, hip, left, closed, initial encounter ([facility] [address] ), WBAT with posterior hip precautions  The patients occupational profile, medical and therapy history includes a extensive additional review of physical, cognitive, or psychosocial history related to current functional performance  Comorbidities affecting functional mobility and ADLS include: arthritis, hypertension, TKA and cancer  Prior to admission, patient was independent with functional mobility without assistive device, independent with ADLS, independent with IADLS and living with spouse in a bi- level home with 12 steps to enter  The evaluation identifies the following performance deficits: weakness, impaired balance, decreased endurance, increased fall risk, new onset of impairment of functional mobility, decreased ADLS, decreased IADLS, pain, decreased activity tolerance, decreased safety awareness, impaired judgement, ortheopedic restrictions and decreased strength, that result in activity limitations and/or participation restrictions   This evaluation requires clinical decision making of high complexity, because the patient presents with comorbidites that affect occupational performance and required significant modification of tasks or assistance with consideration of multiple treatment options  The Barthel Index was used as a functional outcome tool presenting with a score of 45, indicating marked limitations of functional mobility and ADLS  Patient will benefit from skilled Occupational Therapy services to address above deficits and facilitate a safe return to prior level of function  Goals   Patient Goals "to walk properly"   STG Time Frame (1-7 days)   Short Term Goal  Goals established to promote patient goal of walking properly:  Patient will increase standing tolerance to 3 minutes during ADL task to decrease assistance level and decrease fall risk; Patient will increase bed mobility to min assist in preparation for ADLS and transfers; Patient will increase functional mobility to and from bathroom with rolling walker with supervision to increase performance with ADLS and to use a toilet; Patient will tolerate 10 minutes of UE ROM/strengthening to increase general activity tolerance and performance in ADLS/IADLS; Patient will improve functional activity tolerance to 10 minutes of sustained functional tasks to increase participation in basic self-care and decrease assistance level;   Patient will increase dynamic sitting balance to fair+ to improve the ability to sit at edge of bed or on a chair for ADLS;  Patient will increase dynamic standing balance to fair to improve postural stability and decrease fall risk during standing ADLS and transfers  LTG Time Frame (8-14 days)   Long Term Goal Goals established to promote patient goal of walking properly:  Patient will increase standing tolerance to 6 minutes during ADL task to decrease assistance level and decrease fall risk; Patient will increase bed mobility to supervision in preparation for ADLS and transfers;  Patient will increase functional mobility to and from bathroom with rolling walker independently to increase performance with ADLS and to use a toilet; Patient will tolerate 20 minutes of UE ROM/strengthening to increase general activity tolerance and performance in ADLS/IADLS; Patient will improve functional activity tolerance to 20 minutes of sustained functional tasks to increase participation in basic self-care and decrease assistance level;   Patient will increase dynamic sitting balance to good to improve the ability to sit at edge of bed or on a chair for ADLS;  Patient will increase dynamic standing balance to fair+ to improve postural stability and decrease fall risk during standing ADLS and transfers  Functional Transfer Goals   Pt Will Perform All Functional Transfers (STG supervision LTG independent )   ADL Goals   Pt Will Perform Eating (STG independent )   Pt Will Perform Grooming (STG independent )   Pt Will Perform Bathing (STG min assist LTG supervision )   Pt Will Perform UE Dressing (STG supervision LTG Independent )   Pt Will Perform LE Dressing Maintaining hip precautions; With adaptive equipment  (STG min assist LTG supervision )   Pt Will Perform Toileting (STG supervision LTG Independent )   Plan   Treatment Interventions ADL retraining;Functional transfer training;UE strengthening/ROM; Endurance training;Patient/family training;Equipment evaluation/education; Activityengagement; Compensatory technique education   Goal Expiration Date 11/19/18   Treatment Day 1   OT Frequency 3-5x/wk   Additional Treatment Session   Start Time 0945   End Time 6000   Treatment Assessment Patient completed toilet transfer with min assist with verbal cues  Patient reaching unsafety for grab bar verbal cues and mod assist required for safety  Hygiene for urination min assist with setup  Patient stood to wash hands at sink with min assist   Functional mobility 15 feet with RW with verbal cues  Stand to sit min assist with verbal cues  Patient  and son present at end of session    Case management notified family would like to speak with them  Patient requires verbal cues for safety and min assist for transfers, toileting and hand washing at sink      Recommendation   OT Discharge Recommendation Short Term Rehab   Barthel Index   Feeding 5   Bathing 0   Grooming Score 0   Dressing Score 5   Bladder Score 10   Bowels Score 10   Toilet Use Score 5   Transfers (Bed/Chair) Score 10   Mobility (Level Surface) Score 0   Stairs Score 0   Barthel Index Score 45   Licensure   [place] License Number  Coretha Malena [address] OTR/L [id number]

## 2018-11-05 NOTE — PROGRESS NOTES
Progress Note - Orthopedics   Yas Rodriguez 77 y o  female MRN: [id number]  Unit/Bed#: [address] [place] [address] Encounter: [id number]    Assessment:  Bipolar left hip    Plan:  Continue p t  wbat  snf placement     Weight bearing: as tolerated     VTE Pharmacologic Prophylaxis: Sequential compression device (Venodyne)   VTE Mechanical Prophylaxis: sequential compression device    Subjective: pod #2 patient comfortable  oob to chair x2 yesterday  Progressing pain controlled  Denies n/v symptoms eating this am       Vitals: Blood pressure 151/67, pulse 86, temperature 99 3 °F (37 4 °C), temperature source Oral, resp  rate 18, height 5' 4" (1 626 m), weight 65 8 kg (145 lb), SpO2 92 %, not currently breastfeeding  ,Body mass index is 24 89 kg/m²  Intake/Output Summary (Last 24 hours) at 11/05/18 0910  Last data filed at 11/05/18 0421   Gross per 24 hour   Intake                0 ml   Output             1150 ml   Net            -1150 ml       Invasive Devices     Peripheral Intravenous Line            Peripheral IV 11/03/18 Right Arm 1 day                Physical Exam: dresssings clean and dry leg aligned  No edema n/v exam intact  No pain with rom    No swelling no ecchymosis or erythema   Ortho Exam:     Lab, Imaging and other studies:   CBC:   Lab Results   Component Value Date    WBC 6 50 11/05/2018    HGB 10 0 (L) 11/05/2018    HCT 32 0 (L) 11/05/2018    MCV 97 11/05/2018    PLT 86 (L) 11/05/2018    MCH 30 4 11/05/2018    MCHC 31 3 (L) 11/05/2018    RDW 14 0 11/05/2018    MPV 10 3 11/05/2018

## 2018-11-05 NOTE — UTILIZATION REVIEW
Initial Clinical Review    Admission: Date/Time/Statement: [address] DUE TO HIP FRACTURE REQUIRING SURGICAL INTERVENTION  Pt's surgeon at [place]  ARRIVED 11/2/18 @ 1613     INPATIENT 11/2/18 @1624    Orders Placed This Encounter   Procedures    Inpatient Admission     Standing Status:   Standing     Number of Occurrences:   1     Order Specific Question:   Admitting Physician     Answer:   Jung Bradford     Order Specific Question:   Level of Care     Answer:   Med Surg [16]     Order Specific Question:   Estimated length of stay     Answer:   More than 2 Midnights     Order Specific Question:   Certification     Answer:   I certify that inpatient services are medically necessary for this patient for a duration of greater than two midnights  See H&P and MD Progress Notes for additional information about the patient's course of treatment  History of Illness: 76 yo fem to ED from home after fell  Missed last step on stairs, landed on hihp and buttock  Immediate pain, unable to walk  Taken to [facility], xrays showed L subcapital femoral neck fx  Was transferred to [place] for Dr Vonnie Apley to evaluate and perform surgery     Left hip- moderate swelling, tenderness to palpation    Temperature Pulse Respirations Blood Pressure SpO2   11/02/18 1700 11/02/18 1700 11/02/18 1700 11/02/18 1700 11/02/18 1700   100 °F (37 8 °C) 76 18 170/76 90 %      Temp Source Heart Rate Source Patient Position - Orthostatic VS BP Location FiO2 (%)   11/02/18 1700 11/02/18 1700 11/02/18 1700 11/02/18 1700 --   Oral Monitor Lying Left arm       Pain Score       11/02/18 1657       3        Wt Readings from Last 1 Encounters:   11/04/18 65 8 kg (145 lb)     Abnormal Labs/Diagnostic Test Results:   Plate 896  PCXR: nothing acute    Past Medical History:   Diagnosis Date    Arthritis     Cancer of stomach ([facility] [place] [address] ) 2013    Hypertension     overactive bladder        Admitting Diagnosis: Hip fracture ([facility] [address] ) [S72 009A]    Age/Sex: 77 y o  female    Assessment/Plan: 76 yo fem transferred from [facility] admitted due to L femoral neck fracture requiring surgical intervention  NPO after midnight, bedrest, hold heparin for surgery  Cons surgery, cons cardiology-intermediate surgical risk  If develops a fib, start IV amiodarone         Admission Orders:  Scheduled Meds:   Current Facility-Administered Medications:  acetaminophen 650 mg Oral Q6H PRN   aluminum-magnesium hydroxide-simethicone 30 mL Oral Q6H PRN   amiodarone 200 mg Oral Daily With Breakfast   aspirin 81 mg Oral BID   docusate sodium 100 mg Oral BID   fentaNYL 25 mcg Intravenous Q5 Min PRN   HYDROmorphone 1 mg Intravenous Q3H PRN   magnesium hydroxide 30 mL Oral Daily PRN   metoprolol succinate 50 mg Oral Daily   ondansetron 4 mg Intravenous Q6H PRN   ondansetron 4 mg Intravenous Once PRN   oxybutynin 10 mg Oral Daily   oxyCODONE-acetaminophen 1 tablet Oral Q3H PRN   oxyCODONE-acetaminophen 2 tablet Oral Q4H PRN   senna 1 tablet Oral HS PRN     LR 85/hr, changed to 75/hr  scd's  Neurovascular checks q4h  Activity as trudy, ambulate qid, up to chair tid, brp  No hip flexion >90 degr  Wt bearing restriction to BLE  Turning/deep breathing/coughing @frequent intervals  Agarwal  Incentive spirom  Hourly  Abduction pillow  I/O  Ice prn  Trapeze  Cbc, bmp in am  hse diet  Cons cardio  Cons medicine  Cons PT/OT        11/3 taken to OR:   Procedure: bipolar hip hemiarthroplasty-IP only surgery  Operative Findings:Displaced subcapital fracture left hip  General anesthesia

## 2018-11-05 NOTE — SOCIAL WORK
CM informed by therapist Shreya Salinas pt can benefit from STR and would like to speak w/CM while family is present now  Explained STR referral process, pt has choice list and chose in order: [facility] and [facility]  Referrals sent via [employer]  Requesting additional time to choose a third choice

## 2018-11-06 PROBLEM — D64.9 ANEMIA: Status: ACTIVE | Noted: 2018-11-06

## 2018-11-06 LAB
ANION GAP SERPL CALCULATED.3IONS-SCNC: 6 MMOL/L (ref 4–13)
ATRIAL RATE: 241 BPM
ATRIAL RATE: 83 BPM
BUN SERPL-MCNC: 21 MG/DL (ref 5–25)
CALCIUM SERPL-MCNC: 8.4 MG/DL (ref 8.3–10.1)
CHLORIDE SERPL-SCNC: 108 MMOL/L (ref 100–108)
CO2 SERPL-SCNC: 28 MMOL/L (ref 21–32)
CREAT SERPL-MCNC: 0.81 MG/DL (ref 0.6–1.3)
ERYTHROCYTE [DISTWIDTH] IN BLOOD BY AUTOMATED COUNT: 14.2 % (ref 11.6–15.1)
GFR SERPL CREATININE-BSD FRML MDRD: 69 ML/MIN/1.73SQ M
GLUCOSE SERPL-MCNC: 90 MG/DL (ref 65–140)
HCT VFR BLD AUTO: 29.3 % (ref 34.8–46.1)
HGB BLD-MCNC: 9 G/DL (ref 11.5–15.4)
MCH RBC QN AUTO: 30.6 PG (ref 26.8–34.3)
MCHC RBC AUTO-ENTMCNC: 30.7 G/DL (ref 31.4–37.4)
MCV RBC AUTO: 100 FL (ref 82–98)
PLATELET # BLD AUTO: 100 THOUSANDS/UL (ref 149–390)
PMV BLD AUTO: 10.6 FL (ref 8.9–12.7)
POTASSIUM SERPL-SCNC: 3.9 MMOL/L (ref 3.5–5.3)
QRS AXIS: 51 DEGREES
QRS AXIS: 53 DEGREES
QRSD INTERVAL: 74 MS
QRSD INTERVAL: 84 MS
QT INTERVAL: 310 MS
QT INTERVAL: 354 MS
QTC INTERVAL: 397 MS
QTC INTERVAL: 413 MS
RBC # BLD AUTO: 2.94 MILLION/UL (ref 3.81–5.12)
SODIUM SERPL-SCNC: 142 MMOL/L (ref 136–145)
T WAVE AXIS: 41 DEGREES
T WAVE AXIS: 7 DEGREES
VENTRICULAR RATE: 82 BPM
VENTRICULAR RATE: 99 BPM
WBC # BLD AUTO: 5.45 THOUSAND/UL (ref 4.31–10.16)

## 2018-11-06 PROCEDURE — 85027 COMPLETE CBC AUTOMATED: CPT | Performed by: STUDENT IN AN ORGANIZED HEALTH CARE EDUCATION/TRAINING PROGRAM

## 2018-11-06 PROCEDURE — 93010 ELECTROCARDIOGRAM REPORT: CPT | Performed by: INTERNAL MEDICINE

## 2018-11-06 PROCEDURE — 80048 BASIC METABOLIC PNL TOTAL CA: CPT | Performed by: STUDENT IN AN ORGANIZED HEALTH CARE EDUCATION/TRAINING PROGRAM

## 2018-11-06 PROCEDURE — 97535 SELF CARE MNGMENT TRAINING: CPT

## 2018-11-06 PROCEDURE — 97110 THERAPEUTIC EXERCISES: CPT

## 2018-11-06 PROCEDURE — 99232 SBSQ HOSP IP/OBS MODERATE 35: CPT | Performed by: INTERNAL MEDICINE

## 2018-11-06 RX ORDER — AMIODARONE HYDROCHLORIDE 200 MG/1
TABLET ORAL
Refills: 0
Start: 2018-11-06

## 2018-11-06 RX ADMIN — ASPIRIN 81 MG: 81 TABLET, COATED ORAL at 10:18

## 2018-11-06 RX ADMIN — ASPIRIN 81 MG: 81 TABLET, COATED ORAL at 17:20

## 2018-11-06 RX ADMIN — METOPROLOL SUCCINATE 50 MG: 50 TABLET, EXTENDED RELEASE ORAL at 10:17

## 2018-11-06 RX ADMIN — DOCUSATE SODIUM 100 MG: 100 CAPSULE, LIQUID FILLED ORAL at 17:20

## 2018-11-06 RX ADMIN — OXYBUTYNIN CHLORIDE 10 MG: 5 TABLET, EXTENDED RELEASE ORAL at 10:18

## 2018-11-06 RX ADMIN — DOCUSATE SODIUM 100 MG: 100 CAPSULE, LIQUID FILLED ORAL at 10:17

## 2018-11-06 RX ADMIN — AMIODARONE HYDROCHLORIDE 200 MG: 200 TABLET ORAL at 10:18

## 2018-11-06 NOTE — SOCIAL WORK
HARISH met with pt,  and son to review and confirm plans  Pt wanted to make sure SW was aware that [facility]  SW was aware and bed is available as discussed earlier today  SW will assist with transfer when ordered

## 2018-11-06 NOTE — PLAN OF CARE
Problem: PHYSICAL THERAPY ADULT  Goal: Performs mobility at highest level of function for planned discharge setting  See evaluation for individualized goals  Treatment/Interventions: ADL retraining, Functional transfer training, LE strengthening/ROM, Elevations, Therapeutic exercise, Gait training, Bed mobility, Equipment eval/education, Patient/family training          See flowsheet documentation for full assessment, interventions and recommendations  Outcome: Progressing  Prognosis: Good  Problem List: Decreased strength, Decreased endurance, Impaired balance, Decreased mobility, Pain, Orthopedic restrictions  Assessment: Pt demonstrates improving tolerance to funcitonal activity as pt was able to ambulate 50 feet today  Pt's L LE remains weak likely due to combination of L knee arthritis/loss of TKE and hip fx/hemiarthroplasty  Pt is motivated and should do well at STR  Recommendation:  (STR)          See flowsheet documentation for full assessment

## 2018-11-06 NOTE — SOCIAL WORK
SW following to assist with planning  Notified by nurse manager that pt is now requesting additional referrals be made to [address] [place] [address]  Referrals have been made  SW spoke with [facility] admissions  They do have availability and pt's clinical is being reviewed at this time  SW will continue to follow to assist with planning as needed

## 2018-11-06 NOTE — PLAN OF CARE
CARDIOVASCULAR - ADULT     Maintains optimal cardiac output and hemodynamic stability Progressing     Absence of cardiac dysrhythmias or at baseline rhythm Progressing        DISCHARGE PLANNING     Discharge to home or other facility with appropriate resources Progressing        DISCHARGE PLANNING - CARE MANAGEMENT     Discharge to post-acute care or home with appropriate resources Progressing        GENITOURINARY - ADULT     Maintains or returns to baseline urinary function Progressing     Absence of urinary retention Progressing     Urinary catheter remains patent Progressing        INFECTION - ADULT     Absence or prevention of progression during hospitalization Progressing        Knowledge Deficit     Patient/family/caregiver demonstrates understanding of disease process, treatment plan, medications, and discharge instructions Progressing        MUSCULOSKELETAL - ADULT     Maintain or return mobility to safest level of function Progressing     Maintain proper alignment of affected body part Progressing        PAIN - ADULT     Verbalizes/displays adequate comfort level or baseline comfort level Progressing        Potential for Falls     Patient will remain free of falls Progressing        Prexisting or High Potential for Compromised Skin Integrity     Skin integrity is maintained or improved Progressing        SAFETY ADULT     Maintain or return mobility status to optimal level Progressing     Patient will remain free of falls Progressing

## 2018-11-06 NOTE — PROGRESS NOTES
[facility] Medicine Progress Note  Patient: Cameron Ruiz 77 y o  female   MRN: [id number]  PCP: No primary care provider on file  Unit/Bed#: [address] Encounter: [id number]  Date Of Visit: 11/06/18    Primary Service: Denise Sheikh MD  Reason for Consultation:  Medical management    Problem List:    Principal Problem:    Fracture, hip, left, closed, initial encounter [facility])  Active Problems:    PAF (paroxysmal atrial fibrillation) ([facility] )    Fall down stairs    Essential hypertension    Overactive bladder    Stomach cancer ([place] [address] )    Fracture of left hip requiring operative repair, closed, with routine healing, subsequent encounter    Anemia      Assessment & Plan:    * Fracture, hip, left, closed, initial encounter [facility])   Assessment & Plan    · S/p mechanical fall down the stairs  Patient transferred from [address] [place] [address] at her request to be operated on by Dr Tan Buckley  · Left hip XR from OSH: Displaced, comminuted, subcapital fracture of the left femur  · S/p hip replacement, on 11/3  · Post op DVT prophylaxis   · PT/OT, discharge planning to short-term rehab as per primary team       PAF (paroxysmal atrial fibrillation) ([place] [address] )   Assessment & Plan    · New onset, noted on EKG prior to surgery  She was rate controlled at the time  Brief episodes noted preoperatively  Now back in NSR  · Patient seen by cardiology, for preop clearance and perioperative management  · Echo done, normal EF  · Started on amiodarone, remains in sinus rhythm  · Asymptomatic  · Discussed with Cardiology, amiodarone 200 mg daily for 1 month followed by 100 mg   · Patient was advised to follow up with primary cardiologist after discharge for further management and possible Holter monitoring  Fall down stairs   Assessment & Plan    · Mechanical, was down 2 5 hrs  · Patient without any preceding symptoms  · OSH imaging including  · Left hip XR: fracutre of left femur  · Left knee XRL: severe OA which has progressed  Chondrocalcinosis suggesting CPPD  Probable bone infarcts within the distal left femur  · CXR:hyperinflated lungs  Probable scarring of left base  · Creatinine and total CK wnl  · Post op PT/OT     Anemia   Assessment & Plan    Acute postoperative, now stable  Asymptomatic  No evidence of overt bleeding  Monitor     Stomach cancer (HCC)   Assessment & Plan    · Hx of stomach ca s/p radiation in [facility] in   · Has not had follow up since   · Advised to follow up with her oncologist once acute issues resolve     Overactive bladder   Assessment & Plan    · Substitute vesic are for formulary Ditropan     Essential hypertension   Assessment & Plan    · Pt on metoprolol  · BP elevated on arrival, may be 2/2 pain, now improved  · Cont metoprolol           VTE Pharmacologic Prophylaxis:   Pharmacologic: Aspirin b i d , as per orthopedic team  Mechanical:  Yes    Was care plan discussed with the Primary service today?: No    Education and Discussions with Family / Patient: Yes, , Dr Chucho Starr    Time Spent for Care: 45 minutes  More than 50% of total time spent on counseling and coordination of care as described above  Is patient acceptable for discharge from medicine standpoint?: Yes  Discharge Recommendations:  Continue amiodarone dosing as recommended, follow up with primary cardiologist after discharge for Holter monitoring  Monitor hemoglobin    Subjective:   Reports that she is feeling well  Denies any pain  Denies chest pain, shortness of breath, palpitation, dizziness   Denies any  or GI symptoms  Ambulating well with physical therapy    Objective:   Comfortable lying in bed    Negative for Chest Pain, Palpitations, Shortness of Breath, Abdominal Pain, Nausea, Vomiting, Dizziness  All other 10 review of systems negative and without drastic interval changes from yesterday      Vitals:   Temp (24hrs), Av 2 °F (36 8 °C), Min:97 4 °F (36 3 °C), Max:98 8 °F (37 1 °C)    Temp:  [97 4 °F (36 3 °C)-98 8 °F (37 1 °C)] 98 8 °F (37 1 °C)  HR:  [72-82] 72  Resp:  [16-18] 16  BP: (124-148)/(56-75) 133/59  SpO2:  [91 %-99 %] 99 %  Body mass index is 24 89 kg/m²  Input and Output Summary (last 24 hours):     No intake or output data in the 24 hours ending 11/06/18 1333    Physical Exam:     Physical Exam   Constitutional: She is oriented to person, place, and time  She appears well-developed  No distress  HENT:   Head: Normocephalic and atraumatic  Nose: Nose normal    Eyes: Pupils are equal, round, and reactive to light  Conjunctivae are normal    Neck: Normal range of motion  Neck supple  Cardiovascular: Normal rate, regular rhythm and normal heart sounds  Pulmonary/Chest: Effort normal and breath sounds normal  No respiratory distress  She has no wheezes  She has no rales  Abdominal: Soft  Bowel sounds are normal  She exhibits no distension  There is no tenderness  There is no rebound and no guarding  Musculoskeletal: She exhibits no edema  Left hip with dressing C/D, compartment soft and warm  Neurological: She is alert and oriented to person, place, and time  No cranial nerve deficit  Skin: Skin is warm and dry  No rash noted  Additional Data:     Labs:      Results from last 7 days  Lab Units 11/06/18  0550   WBC Thousand/uL 5 45   HEMOGLOBIN g/dL 9 0*   HEMATOCRIT % 29 3*   PLATELETS Thousands/uL 100*       Results from last 7 days  Lab Units 11/06/18  0550   POTASSIUM mmol/L 3 9   CHLORIDE mmol/L 108   CO2 mmol/L 28   BUN mg/dL 21   CREATININE mg/dL 0 81   CALCIUM mg/dL 8 4       Results from last 7 days  Lab Units 11/02/18  1848   INR  0 99       * I Have Reviewed All Lab Data Listed Above  * Additional Pertinent Lab Tests Reviewed: Katherine 66 Admission Reviewed    Imaging:    Xr Chest Portable    Result Date: 11/2/2018  Narrative: CHEST INDICATION:   pre-op   COMPARISON:  None EXAM PERFORMED/VIEWS:  XR CHEST PORTABLE FINDINGS: Heart shadow appears unremarkable  Atherosclerotic vascular calcifications are noted  The lungs are clear  No pneumothorax or pleural effusion  Osseous structures appear within normal limits for patient age  Impression: No acute cardiopulmonary disease  Workstation performed: [id number]     Xr Hip/pelv 1 Vw Left If Performed    Result Date: 11/4/2018  Narrative: LEFT HIP INDICATION:   Left hip hemiarthroplasty    COMPARISON:  None VIEWS:  XR HIP/PELV 1 VW LEFT W PELVIS IF PERFORMED FINDINGS: Solitary portable radiograph of the left hip demonstrates expected postsurgical appearance of left hemiarthroplasty without evidence of hardware complication  Subcutaneous emphysema and stable alignment is noted  Impression: Expected portable radiographic appearance of left hemiarthroplasty   Workstation performed: [id number]     Imaging Reports Reviewed by myself    Cultures:   Blood Culture: No results found for: BLOODCX  Urine Culture: No results found for: URINECX  Sputum Culture: No components found for: SPUTUMCX  Wound Culture: No results found for: WOUNDCULT    Last 24 Hours Medication List:     Current Facility-Administered Medications:  acetaminophen 650 mg Oral Q6H PRN Cristin Powell MD   aluminum-magnesium hydroxide-simethicone 30 mL Oral Q6H PRN Nestor Lal PA-C   amiodarone 200 mg Oral Daily With [facility] MD Jake   aspirin 81 mg Oral BID Nestor Lal PA-C   docusate sodium 100 mg Oral BID Cristin Powell MD   fentaNYL 25 mcg Intravenous Q5 Min PRN Mohinder Delong MD   HYDROmorphone 1 mg Intravenous Q3H PRN Beth Fermin PA-C   magnesium hydroxide 30 mL Oral Daily PRN Cristin Powell MD   metoprolol succinate 50 mg Oral Daily Cristin Powell MD   ondansetron 4 mg Intravenous Q6H PRN Beth Fermin PA-C   ondansetron 4 mg Intravenous Once PRN Mohinder Delong MD   oxybutynin 10 mg Oral Daily Cristin Powell MD   oxyCODONE-acetaminophen 1 tablet Oral Q3H PRN Nestor Lal PA-C   oxyCODONE-acetaminophen 2 tablet Oral Q4H PRN Mikayla Abrams GERTRUDE Clark   senna 1 tablet Oral HS PRN Chata Rossi PA-C        Today, Patient Was Seen By: Chester Goodpasture, MD    ** Please Note: "This note has been constructed using a voice recognition system  Therefore there may be syntax, spelling, and/or grammatical errors   Please call if you have any questions  "**

## 2018-11-06 NOTE — UTILIZATION REVIEW
Continued Stay Review    Date: 11/6/18  Inpatient    Age/Sex: 77 y o  female initially admitted 11/2/18 after transfer from [facility] due to hip fracture requiring surgical intervention  Surgery completed 11/3/18  Assessment/Plan: feeling tired today  +pain  PT following  POD#3 hemiarthroplasty hip; STR placement is pending    Vital Signs: /59 (BP Location: Left arm)   Pulse 72   Temp 98 8 °F (37 1 °C) (Oral)   Resp 16   Ht 5' 4" (1 626 m)   Wt 65 8 kg (145 lb)   SpO2 99%   Breastfeeding? No   BMI 24 89 kg/m²     Medications:   Scheduled Meds:   Current Facility-Administered Medications:  acetaminophen 650 mg Oral Q6H PRN   aluminum-magnesium hydroxide-simethicone 30 mL Oral Q6H PRN   amiodarone 200 mg Oral Daily With Breakfast   aspirin 81 mg Oral BID   docusate sodium 100 mg Oral BID   fentaNYL 25 mcg Intravenous Q5 Min PRN   HYDROmorphone 1 mg Intravenous Q3H PRN   magnesium hydroxide 30 mL Oral Daily PRN   metoprolol succinate 50 mg Oral Daily   ondansetron 4 mg Intravenous Q6H PRN   ondansetron 4 mg Intravenous Once PRN   oxybutynin 10 mg Oral Daily   oxyCODONE-acetaminophen 1 tablet Oral Q3H PRN   oxyCODONE-acetaminophen 2 tablet Oral Q4H PRN   senna 1 tablet Oral HS PRN     Abnormal Labs/Diagnostic Results:   11/4 hip/pelvis: Expected portable radiographic appearance of left hemiarthroplasty    11/6: hgb 9   hct 29 3  Plate 271    Discharge Plan: TBD, await placement-accepted at [facility]

## 2018-11-06 NOTE — PLAN OF CARE
Problem: DISCHARGE PLANNING - CARE MANAGEMENT  Goal: Discharge to post-acute care or home with appropriate resources  INTERVENTIONS:  - Conduct assessment to determine patient/family and health care team treatment goals, and need for post-acute services based on payer coverage, community resources, and patient preferences, and barriers to discharge  - Address psychosocial, clinical, and financial barriers to discharge as identified in assessment in conjunction with the patient/family and health care team  - Arrange appropriate level of post-acute services according to patient's   needs and preference and payer coverage in collaboration with the physician and health care team  - Communicate with and update the patient/family, physician, and health care team regarding progress on the discharge plan  - Arrange appropriate transportation to post-acute venues  To STR post op care  Outcome: Progressing  Pt requesting additional referrals be made to [facility]  Referrals have been made

## 2018-11-06 NOTE — OCCUPATIONAL THERAPY NOTE
OT TREATMENT       11/06/18 1402   Restrictions/Precautions   LLE Weight Bearing Per Order WBAT   Braces or Orthoses (abduction pillow)   Other Precautions Fall Risk;Pain; Chair Alarm; Bed Alarm   Pain Assessment   Pain Assessment No/denies pain   Pain Score 5   Pain Type Surgical pain   Pain Location Hip   Pain Orientation Left   [facility] Pain Intervention(s) Medication (See MAR); Repositioned; Ambulation/increased activity   ADL   Where Assessed Standing at sink   Grooming Assistance 4  Minimal Assistance   LB Dressing Assistance 2  Maximal Assistance   Toileting Assistance  4  Minimal Assistance   Functional Standing Tolerance   Time 2 minutes x 2    Activity ADL at sink, static standing   Bed Mobility   Supine to Sit 4  Minimal assistance   Additional items Assist x 1; Increased time required;LE management;Verbal cues   Transfers   Sit to Stand 3  Moderate assistance   Additional items Assist x 1;Verbal cues   Stand to Sit 4  Minimal assistance   Additional items Assist x 1   Stand pivot 4  Minimal assistance   Additional items Assist x 1;Verbal cues   Toilet transfer 4  Minimal assistance   Additional items Assist x 1;Verbal cues;Raised toilet seat   Functional Mobility   Functional Mobility 4  Minimal assistance   Additional Comments 40 feet   Additional items Rolling walker   Cognition   Overall Cognitive Status WFL   Arousal/Participation Alert; Cooperative   Attention Within functional limits   Orientation Level Oriented X4   Memory Within functional limits   Following Commands Follows multistep commands with increased time or repetition   Comments Able to verbally recall 3/3 total hip precautions independently  Activity Tolerance   Activity Tolerance Patient limited by pain; Patient limited by fatigue   Assessment   Assessment Pt demonstrating improved strength, balance and functional activity tolerance allowing for increased active participation with ADL and mobility tasks   Able to verbally recall 3/3 THPs and is compliant with precautions with min cueing only during functional tasks  Good progress towards goals  Pt left sitting in chair with all needs within reach and tab alarm in place  Cont OT per POC  Plan   Treatment Interventions ADL retraining;Functional transfer training;UE strengthening/ROM; Endurance training;Patient/family training;Equipment evaluation/education; Activityengagement; Compensatory technique education   Treatment Day 2   OT Frequency 3-5x/wk   Recommendation   OT Discharge Recommendation Short Term Rehab   Licensure   [place] License Number  Gabrielle Muse [address], OTR/L, [place] Lic# [id number]

## 2018-11-06 NOTE — PLAN OF CARE
Problem: OCCUPATIONAL THERAPY ADULT  Goal: Performs self-care activities at highest level of function for planned discharge setting  See evaluation for individualized goals  Outcome: Progressing  Limitation: Decreased ADL status, Decreased UE strength, Decreased Safe judgement during ADL, Decreased endurance, Decreased self-care trans, Decreased high-level ADLs (decreased balance and mobility )  Prognosis: Good  Assessment: Pt demonstrating improved strength, balance and functional activity tolerance allowing for increased active participation with ADL and mobility tasks  Able to verbally recall 3/3 THPs and is compliant with precautions with min cueing only during functional tasks  Good progress towards goals  Pt left sitting in chair with all needs within reach and tab alarm in place  Cont OT per POC       OT Discharge Recommendation: Short Term Rehab

## 2018-11-06 NOTE — PHYSICAL THERAPY NOTE
PT TREATMENT     11/06/18 0917   Pain Assessment   Pain Assessment No/denies pain   Restrictions/Precautions   LLE Weight Bearing Per Order WBAT   Other Precautions (posterior hip precautions)   General   Chart Reviewed Yes   Cognition   Overall Cognitive Status WFL   Subjective   Subjective "doing OK"   Bed Mobility   Supine to Sit 3  Moderate assistance   Sit to Supine 3  Moderate assistance   Transfers   Sit to Stand 4  Minimal assistance   Stand to Sit 4  Minimal assistance   Stand pivot 4  Minimal assistance   Additional items (with walker)   Ambulation/Elevation   Gait pattern (slow hilda, walks on R toes due to knee flex contracture)   Gait Assistance 4  Minimal assist   Assistive Device Rolling walker   Distance 50 feet   Exercises   Quad Sets 10 reps; Supine;AROM; Left   Heelslides 10 reps; Left;AAROM; Supine   Glute Sets 10 reps; Left;Supine   Hip Abduction Supine;10 reps; Left;AAROM   Knee AROM Short Arc Quad Left;AAROM;10 reps; Supine   Ankle Pumps 20 reps;Bilateral;AROM; Supine   Assessment   Prognosis Good   Problem List Decreased strength;Decreased endurance; Impaired balance;Decreased mobility;Pain;Orthopedic restrictions   Assessment Pt demonstrates improving tolerance to funcitonal activity as pt was able to ambulate 50 feet today  Pt's L LE remains weak likely due to combination of L knee arthritis/loss of TKE and hip fx/hemiarthroplasty  Pt is motivated and should do well at STR  Goals   Treatment Day 3   Plan   Treatment/Interventions ADL retraining;Functional transfer training;LE strengthening/ROM; Elevations; Therapeutic exercise; Endurance training;Patient/family training;Equipment eval/education; Bed mobility;Gait training   Progress Progressing toward goals   PT Frequency Once a day   Recommendation   Recommendation [facility])   Licensure   [place] License Number  Ino STARR  [id number]

## 2018-11-06 NOTE — PLAN OF CARE
Problem: DISCHARGE PLANNING - CARE MANAGEMENT  Goal: Discharge to post-acute care or home with appropriate resources  INTERVENTIONS:  - Conduct assessment to determine patient/family and health care team treatment goals, and need for post-acute services based on payer coverage, community resources, and patient preferences, and barriers to discharge  - Address psychosocial, clinical, and financial barriers to discharge as identified in assessment in conjunction with the patient/family and health care team  - Arrange appropriate level of post-acute services according to patient's   needs and preference and payer coverage in collaboration with the physician and health care team  - Communicate with and update the patient/family, physician, and health care team regarding progress on the discharge plan  - Arrange appropriate transportation to post-acute venues  To STR post op care  Outcome: Adequate for Discharge  Pt wanted to make sure SW was aware that [facility] [place] is her first preference for rehab  SW was aware and bed is available as discussed earlier today  SW will assist with transfer when ordered

## 2018-11-06 NOTE — SOCIAL WORK
SW following to assist with DCP  STR placement is planned  Pt has been accepted by [facility] and bed is available  SW spoke with pt about acceptance and availability  Pt agreeable with transfer to [facility] when discharged  SW will notify physicians of above  SW will follow to assist with transfer when ordered

## 2018-11-06 NOTE — PLAN OF CARE
Problem: DISCHARGE PLANNING - CARE MANAGEMENT  Goal: Discharge to post-acute care or home with appropriate resources  INTERVENTIONS:  - Conduct assessment to determine patient/family and health care team treatment goals, and need for post-acute services based on payer coverage, community resources, and patient preferences, and barriers to discharge  - Address psychosocial, clinical, and financial barriers to discharge as identified in assessment in conjunction with the patient/family and health care team  - Arrange appropriate level of post-acute services according to patient's   needs and preference and payer coverage in collaboration with the physician and health care team  - Communicate with and update the patient/family, physician, and health care team regarding progress on the discharge plan  - Arrange appropriate transportation to post-acute venues  To STR post op care  Outcome: Adequate for Discharge  STR placement is planned  Pt has been accepted by [facility] [place] and bed is available  Pt can transfer to rehab whenever ready

## 2018-11-07 VITALS
TEMPERATURE: 98.2 F | RESPIRATION RATE: 18 BRPM | OXYGEN SATURATION: 99 % | HEART RATE: 84 BPM | BODY MASS INDEX: 24.75 KG/M2 | SYSTOLIC BLOOD PRESSURE: 112 MMHG | HEIGHT: 64 IN | WEIGHT: 145 LBS | DIASTOLIC BLOOD PRESSURE: 56 MMHG
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
## 2018-11-07 LAB
ANION GAP SERPL CALCULATED.3IONS-SCNC: 8 MMOL/L (ref 4–13)
BUN SERPL-MCNC: 19 MG/DL (ref 5–25)
CALCIUM SERPL-MCNC: 8.3 MG/DL (ref 8.3–10.1)
CHLORIDE SERPL-SCNC: 107 MMOL/L (ref 100–108)
CO2 SERPL-SCNC: 28 MMOL/L (ref 21–32)
CREAT SERPL-MCNC: 0.83 MG/DL (ref 0.6–1.3)
ERYTHROCYTE [DISTWIDTH] IN BLOOD BY AUTOMATED COUNT: 13.9 % (ref 11.6–15.1)
GFR SERPL CREATININE-BSD FRML MDRD: 67 ML/MIN/1.73SQ M
GLUCOSE SERPL-MCNC: 89 MG/DL (ref 65–140)
HCT VFR BLD AUTO: 30.1 % (ref 34.8–46.1)
HGB BLD-MCNC: 9.2 G/DL (ref 11.5–15.4)
MCH RBC QN AUTO: 30 PG (ref 26.8–34.3)
MCHC RBC AUTO-ENTMCNC: 30.6 G/DL (ref 31.4–37.4)
MCV RBC AUTO: 98 FL (ref 82–98)
PLATELET # BLD AUTO: 107 THOUSANDS/UL (ref 149–390)
PMV BLD AUTO: 10.1 FL (ref 8.9–12.7)
POTASSIUM SERPL-SCNC: 3.9 MMOL/L (ref 3.5–5.3)
RBC # BLD AUTO: 3.07 MILLION/UL (ref 3.81–5.12)
SODIUM SERPL-SCNC: 143 MMOL/L (ref 136–145)
WBC # BLD AUTO: 5.38 THOUSAND/UL (ref 4.31–10.16)

## 2018-11-07 PROCEDURE — 85027 COMPLETE CBC AUTOMATED: CPT | Performed by: STUDENT IN AN ORGANIZED HEALTH CARE EDUCATION/TRAINING PROGRAM

## 2018-11-07 PROCEDURE — 99232 SBSQ HOSP IP/OBS MODERATE 35: CPT | Performed by: INTERNAL MEDICINE

## 2018-11-07 PROCEDURE — 80048 BASIC METABOLIC PNL TOTAL CA: CPT | Performed by: STUDENT IN AN ORGANIZED HEALTH CARE EDUCATION/TRAINING PROGRAM

## 2018-11-07 PROCEDURE — 97110 THERAPEUTIC EXERCISES: CPT

## 2018-11-07 RX ORDER — FAMOTIDINE 20 MG/1
20 TABLET, FILM COATED ORAL 2 TIMES DAILY
Refills: 0
Start: 2018-11-07 | End: 2021-06-21

## 2018-11-07 RX ORDER — DOCUSATE SODIUM 100 MG/1
100 CAPSULE, LIQUID FILLED ORAL 2 TIMES DAILY
Qty: 10 CAPSULE | Refills: 0
Start: 2018-11-07

## 2018-11-07 RX ORDER — SENNOSIDES 8.6 MG
1 TABLET ORAL
Qty: 120 EACH | Refills: 0
Start: 2018-11-07

## 2018-11-07 RX ORDER — OXYCODONE HYDROCHLORIDE AND ACETAMINOPHEN 5; 325 MG/1; MG/1
1 TABLET ORAL EVERY 6 HOURS PRN
Refills: 0
Start: 2018-11-07 | End: 2018-11-17

## 2018-11-07 RX ORDER — ASPIRIN 81 MG/1
81 TABLET ORAL 2 TIMES DAILY
Refills: 0
Start: 2018-11-07

## 2018-11-07 RX ADMIN — OXYBUTYNIN CHLORIDE 10 MG: 5 TABLET, EXTENDED RELEASE ORAL at 09:48

## 2018-11-07 RX ADMIN — AMIODARONE HYDROCHLORIDE 200 MG: 200 TABLET ORAL at 09:48

## 2018-11-07 RX ADMIN — DOCUSATE SODIUM 100 MG: 100 CAPSULE, LIQUID FILLED ORAL at 09:48

## 2018-11-07 RX ADMIN — ASPIRIN 81 MG: 81 TABLET, COATED ORAL at 09:48

## 2018-11-07 RX ADMIN — METOPROLOL SUCCINATE 50 MG: 50 TABLET, EXTENDED RELEASE ORAL at 09:48

## 2018-11-07 NOTE — [place] UNIVERSAL TRANSFER FORM
[place] UNIVERSAL TRANSFER FORM  (ALL ITEMS MUST BE COMPLETED)    1  TRANSFER FROM: [address] [facility]      TRANSFER TO: [facility]    2  DATE OF TRANSFER: 11/7/2018                        TIME OF TRANSFER: 16:00    3  PATIENT NAME: Bernadette Canales,        YOB: 1940                             GENDER: female    4  LANGUAGE:   Unknown    5  PHYSICIAN NAME:  Estela Kyle MD                   PHONE: [phone]    6  CODE STATUS: Level 1 - Full Code        Out of Hospital DNR Attached: No    7  :                                      :  Extended Emergency Contact Information  Primary Emergency Contact: Junior Carpenter   [facility] [address] [facility] Phone: [phone]  Mobile Phone: [phone]  Relation: [place] [address] Representative/Proxy:  No           Legal Guardian:  No             NAME OF:           HEALTH CARE REPRESENTATIVE/PROXY:                                         OR           LEGAL GUARDIAN, IF NOT :                                               PHONE:  (Day)           (Night)                        (Cell)    8  REASON FOR TRANSFER: (Must include brief medical history and recent changes in physical function or cognition ) Closed Fracture L hip, total L hip replacement             V/S: /56 (BP Location: Left arm)   Pulse 84   Temp 98 2 °F (36 8 °C) (Oral)   Resp 18   Ht 5' 4" (1 626 m)   Wt 65 8 kg (145 lb)   SpO2 99%   Breastfeeding? No   BMI 24 89 kg/m²           PAIN: None    9  PRIMARY DIAGNOSIS: Fracture, hip, left, closed, initial encounter ([facility] [address] )      Secondary Diagnosis:         Pacemaker: No      Internal Defib: No          Mental Health Diagnosis (if Applicable):    10  RESTRAINTS: No     11  RESPIRATORY NEEDS: None    12  ISOLATION/PRECAUTION: None    13  ALLERGY: Patient has no known allergies  14  SENSORY:       Vision Poor and Glasses    15   SKIN CONDITION: Yes:  Surgical    16  DIET: Regular    17  IV ACCESS: None    18  PERSONAL ITEMS SENT WITH PATIENT: Glasses    19  ATTACHED DOCUMENTS: MUST ATTACH CURRENT MEDICATION INFORMATION Face Sheet, MAR, Medication Reconciliation, Labs, Operative Report, Code Status, Discharge Summary, PT Note, OT Note and HX/PE    20  AT RISK ALERTS:Falls        HARM TO: N/A    21  WEIGHT BEARING STATUS:         Left Leg: Limited        Right Leg: Full    22  MENTAL STATUS:Alert and Oriented    23  FUNCTION:        Walk: With Help        Transfer: With Help        Toilet: With Help        Feed: Self    24  IMMUNIZATIONS/SCREENING:     Immunization History   Administered Date(s) Administered    Influenza, high dose seasonal 0 5 mL 11/04/2018    Pneumococcal Conjugate 13-Valent 11/04/2018       25  BOWEL: Continent and Date Last BM11/05/2018    26  BLADDER: Continent    27   SENDING FACILITY CONTACT:                 Title:         Unit:         Phone:           [address] (if known):        Title:        Unit:         Phone:         FORM PREFILLED BY (if applicable)       Title:       Unit:        Phone:         FORM COMPLETED BY Winnie Briones RN      Title: [facility]      Phone: [phone]

## 2018-11-07 NOTE — PHYSICAL THERAPY NOTE
PT TREATMENT     11/07/18 1553   Pain Assessment   Pain Assessment 0-10   Pain Score 3   Pain Type Acute pain   Pain Location Hip;Leg   Pain Orientation Left   Restrictions/Precautions   LLE Weight Bearing Per Order WBAT   Other Precautions Fall Risk;Pain;Bed Alarm; Chair Alarm;THR   General   Chart Reviewed Yes   Family/Caregiver Present No   Subjective   Subjective "I'm leaving at some point today"   Bed Mobility   Supine to Sit 4  Minimal assistance   Additional items Assist x 1;LE management   Sit to Supine 4  Minimal assistance   Additional items Assist x 1;LE management   Transfers   Sit to Stand 4  Minimal assistance   Additional items Assist x 1;Verbal cues   Stand to Sit 4  Minimal assistance   Additional items Assist x 1;Verbal cues   Ambulation/Elevation   Gait pattern (amb on toes LLE)   Gait Assistance 4  Minimal assist   Additional items Assist x 1;Verbal cues; Tactile cues   Assistive Device Rolling walker   Distance 50 feet with change in direction   Balance   Static Sitting Good   Static Standing Fair   Dynamic Standing Fair -   Ambulatory Fair -   Activity Tolerance   Activity Tolerance Patient limited by fatigue;Patient limited by pain   Exercises   Heelslides AAROM; Left;10 reps; Supine   Hip Abduction AAROM; Left;10 reps; Supine   Knee AROM Short Arc Quad AAROM; Left;10 reps; Supine   Heel Cord Stretch Left;10 reps; Supine   Assessment   Assessment Pt with good tolerance to LLE exercise, bed mob, trans and gait with RW  Pt will cont to benefit from skilled PT services  Pt able to recall 3/3 THP  Goals   Treatment Day 4   Plan   Treatment/Interventions ADL retraining;Functional transfer training;LE strengthening/ROM; Therapeutic exercise; Endurance training;Patient/family training;Bed mobility;Gait training;Elevations   PT Frequency (1-2x/day)   Recommendation   Recommendation (STR)   Licensure   [place] License Number  [id number] [place] [address] [id number]

## 2018-11-07 NOTE — SOCIAL WORK
Discharge ordered  Pt will [facility] [place] [facility]  Reviewed transportation options with pt and family  It has been determined that wheelchair [facility] transport would be acceptable  Pt and  aware of charges associated with [facility] service  [place] scheduled to transport via wheelchair David Sarabia RN [facility] [place] and pt/ aware of plan

## 2018-11-07 NOTE — NURSING NOTE
Pt left via wheelchair accompanied by transport team  Discharge instructions provided to receiving facility  Non-smoker  Up to date with immunizations  IV dc and intact  No further questions at this time

## 2018-11-07 NOTE — PROGRESS NOTES
[facility] Internal Medicine Progress Note  Patient: David Fang 77 y o  female   MRN: [id number]  PCP: No primary care provider on file  Unit/Bed#: [address] Encounter: [id number]  Date Of Visit: 11/07/18    Primary Service: Alyssia Carrasco MD  Reason for Consultation:  Medical management    Problem List:    Principal Problem:    Fracture, hip, left, closed, initial encounter [facility])  Active Problems:    PAF (paroxysmal atrial fibrillation) ([facility] )    Fall down stairs    Essential hypertension    Overactive bladder    Stomach cancer ([place] [address] )    Fracture of left hip requiring operative repair, closed, with routine healing, subsequent encounter    Anemia      Assessment & Plan:    * Fracture, hip, left, closed, initial encounter [facility])   Assessment & Plan    · S/p mechanical fall down the stairs  Patient transferred from [address] [place] [address] at her request to be operated on by Dr Brie Lazaro  · Left hip XR from OSH: Displaced, comminuted, subcapital fracture of the left femur  · S/p hip replacement, on 11/3  · Post op DVT prophylaxis   · PT/OT, discharge planning to short-term rehab as per primary team  · Follow up with Orthopedics       PAF (paroxysmal atrial fibrillation) ([place] [address] )   Assessment & Plan    · New onset, noted on EKG prior to surgery  She was rate controlled at the time  Brief episodes noted preoperatively  Now back in NSR  · Patient seen by cardiology, for preop clearance and perioperative management  · Echo done, normal EF  · Started on amiodarone, remains in sinus rhythm  · Asymptomatic  · Discussed with Cardiology, amiodarone 200 mg daily for 1 month followed by 100 mg   · Patient was advised to follow up with primary cardiologist after discharge for further management and possible Holter monitoring    Discussed with the patient     Fall down stairs   Assessment & Plan    · Mechanical, was down 2 5 hrs  · Patient without any preceding symptoms  · OSH imaging including  · Left hip XR: fracutre of left femur  · Left knee XRL: severe OA which has progressed  Chondrocalcinosis suggesting CPPD  Probable bone infarcts within the distal left femur  · CXR:hyperinflated lungs  Probable scarring of left base  · Creatinine and total CK wnl  · Post op PT/OT     Anemia   Assessment & Plan    Acute postoperative, now remains stable  Asymptomatic  No evidence of overt bleeding  Monitor     Stomach cancer (HCC)   Assessment & Plan    · Hx of stomach ca s/p radiation in [facility] in 2013  · Has not had follow up since   · Advised to follow up with her oncologist once acute issues resolve     Overactive bladder   Assessment & Plan    · Substitute vesic are for formulary Ditropan     Essential hypertension   Assessment & Plan    · Pt on metoprolol  · BP elevated on arrival, may be 2/2 pain, now improved  · Cont metoprolol           VTE Pharmacologic Prophylaxis:   Pharmacologic: Aspirin b i d , as per orthopedic team  Mechanical:  Yes    Was care plan discussed with the Primary service today?: No    Education and Discussions with Family / Patient: Yes,     Time Spent for Care: 45 minutes  More than 50% of total time spent on counseling and coordination of care as described above  Is patient acceptable for discharge from medicine standpoint?: Yes  Discharge Recommendations:  Continue amiodarone dosing as recommended, follow up with primary cardiologist after discharge for Holter monitoring  Monitor hemoglobin    Subjective:   Sitting up in chair having meal  Improving with physical therapy  Pain is controlled  Denies chest pain shortness of breath palpitation or dizziness  Discussed with patient regarding diagnosis of atrial fibrillation and follow-up plan    Objective:   Comfortable lying in bed    Negative for Chest Pain, Palpitations, Shortness of Breath, Abdominal Pain, Nausea, Vomiting, Dizziness  All other 10 review of systems negative and without drastic interval changes from yesterday      Vitals:   Temp (24hrs), Av 1 °F (36 7 °C), Min:97 5 °F (36 4 °C), Max:98 3 °F (36 8 °C)    Temp:  [97 5 °F (36 4 °C)-98 3 °F (36 8 °C)] 98 2 °F (36 8 °C)  HR:  [68-88] 84  Resp:  [18] 18  BP: (112-149)/(56-67) 112/56  SpO2:  [95 %-99 %] 99 %  Body mass index is 24 89 kg/m²  Input and Output Summary (last 24 hours): Intake/Output Summary (Last 24 hours) at 18 1330  Last data filed at 18 1108   Gross per 24 hour   Intake                0 ml   Output             1000 ml   Net            -1000 ml       Physical Exam:     Physical Exam   Constitutional: She appears well-developed  No distress  HENT:   Head: Normocephalic and atraumatic  Nose: Nose normal    Eyes: Pupils are equal, round, and reactive to light  Conjunctivae are normal    Neck: Normal range of motion  Neck supple  Cardiovascular: Normal rate, regular rhythm and normal heart sounds  Pulmonary/Chest: Effort normal and breath sounds normal  No respiratory distress  She has no wheezes  She has no rales  Abdominal: Soft  Bowel sounds are normal  She exhibits no distension  There is no tenderness  There is no rebound and no guarding  Musculoskeletal: She exhibits no edema  Left hip with dressing C/D, compartment soft and warm  Neurological: She is alert  No cranial nerve deficit  Skin: Skin is warm and dry  No rash noted  Additional Data:     Labs:      Results from last 7 days  Lab Units 18  0504   WBC Thousand/uL 5 38   HEMOGLOBIN g/dL 9 2*   HEMATOCRIT % 30 1*   PLATELETS Thousands/uL 107*       Results from last 7 days  Lab Units 18  0504   POTASSIUM mmol/L 3 9   CHLORIDE mmol/L 107   CO2 mmol/L 28   BUN mg/dL 19   CREATININE mg/dL 0 83   CALCIUM mg/dL 8 3       Results from last 7 days  Lab Units 18  1848   INR  0 99       * I Have Reviewed All Lab Data Listed Above  * Additional Pertinent Lab Tests Reviewed:  [facility] Admission Reviewed    Imaging:    Xr Chest Portable    Result Date: 11/2/2018  Narrative: CHEST INDICATION:   pre-op  COMPARISON:  None EXAM PERFORMED/VIEWS:  XR CHEST PORTABLE FINDINGS: Heart shadow appears unremarkable  Atherosclerotic vascular calcifications are noted  The lungs are clear  No pneumothorax or pleural effusion  Osseous structures appear within normal limits for patient age  Impression: No acute cardiopulmonary disease  Workstation performed: [id number]     Xr Hip/pelv 1 Vw Left If Performed    Result Date: 11/4/2018  Narrative: LEFT HIP INDICATION:   Left hip hemiarthroplasty    COMPARISON:  None VIEWS:  XR HIP/PELV 1 VW LEFT W PELVIS IF PERFORMED FINDINGS: Solitary portable radiograph of the left hip demonstrates expected postsurgical appearance of left hemiarthroplasty without evidence of hardware complication  Subcutaneous emphysema and stable alignment is noted  Impression: Expected portable radiographic appearance of left hemiarthroplasty   Workstation performed: [id number]     Imaging Reports Reviewed by myself    Cultures:   Blood Culture: No results found for: BLOODCX  Urine Culture: No results found for: URINECX  Sputum Culture: No components found for: SPUTUMCX  Wound Culture: No results found for: WOUNDCULT    Last 24 Hours Medication List:     Current Facility-Administered Medications:  acetaminophen 650 mg Oral Q6H PRN Johny Harper MD   aluminum-magnesium hydroxide-simethicone 30 mL Oral Q6H PRN Kalin Taylor PA-C   amiodarone 200 mg Oral Daily With 216 Rashi Joseph MD   aspirin 81 mg Oral BID Kalin Taylor PA-C   docusate sodium 100 mg Oral BID Johny Harper MD   magnesium hydroxide 30 mL Oral Daily PRN Johny Harper MD   metoprolol succinate 50 mg Oral Daily Johny Harper MD   ondansetron 4 mg Intravenous Q6H PRN Bonnie Waters PA-C   oxybutynin 10 mg Oral Daily Johny Harper MD   oxyCODONE-acetaminophen 1 tablet Oral Q3H PRN Kalin Taylor PA-C   senna 1 tablet Oral HS PRN Kalin Taylor PA-C        Today, Patient Was Seen By: Marilyn Farris MD    ** Please Note: "This note has been constructed using a voice recognition system  Therefore there may be syntax, spelling, and/or grammatical errors   Please call if you have any questions  "**

## 2018-11-07 NOTE — DISCHARGE SUMMARY
Discharge Summary - Sheyla Erickson 77 y o  female MRN: [id number]    Unit/Bed#: [address] [facility] Encounter: [id number]    Admission Date:   Admission Orders     Ordered        11/02/18 1624  Inpatient Admission  Once               Admitting Diagnosis: Hip fracture ([address] ) Adan Ladd    HPI: 66y/o female seen at [facility]  For a fx left hip she desired to be treated by dr Kaden Tejeda   Patient neede to have a bipolat hip implanted,  She wanted to be tranferred to [place] [address] for treatment under dr mejia care  She was subsequently transferred onto his service and after review of xrays and a medical and cardiac clearance including an echo all risks and benefits explained she was scheduled the next day for a bipolar left hip  Procedures Performed: bipolar left hip    Summary of Hospital Course: patient admitted a [facility] inpatient for a bipolar left hip  Patient had an uneventful intraop and post op course  She progressed in p t  Pain controlled  Significant Findings, Care, Treatment and Services Provided: bipolar left hip    Complications: none    Discharge Diagnosis: hemiarthroplasty left hip     Resolved Problems  Date Reviewed: 11/2/2018          Resolved    Fall down stairs 11/7/2018     Resolved by  Oumou Carpenter MD          Condition at Discharge: good         Discharge instructions/Information to patient and family:   See after visit summary for information provided to patient and family  Provisions for Follow-Up Care:  See after visit summary for information related to follow-up care and any pertinent home health orders  PCP: No primary care provider on file  Disposition: Short-term rehab at facility    Planned Readmission: No    Discharge Statement   I spent 10 minutes discharging the patient  This time was spent on the day of discharge  I had direct contact with the patient on the day of discharge  Additional documentation is required if more than 30 minutes were spent on discharge  Discharge Medications:  See after visit summary for reconciled discharge medications provided to patient and family

## 2021-06-21 ENCOUNTER — OFFICE VISIT (OUTPATIENT)
Dept: URGENT CARE | Facility: CLINIC | Age: 81
End: 2021-06-21
Payer: MEDICARE

## 2021-06-21 VITALS
HEART RATE: 64 BPM | TEMPERATURE: 97.7 F | SYSTOLIC BLOOD PRESSURE: 124 MMHG | BODY MASS INDEX: 29.71 KG/M2 | WEIGHT: 174 LBS | HEIGHT: 64 IN | OXYGEN SATURATION: 100 % | RESPIRATION RATE: 18 BRPM | DIASTOLIC BLOOD PRESSURE: 80 MMHG

## 2021-06-21 DIAGNOSIS — L23.9 ALLERGIC CONTACT DERMATITIS, UNSPECIFIED TRIGGER: Primary | ICD-10-CM

## 2021-06-21 PROBLEM — Z00.00 HEALTH CARE MAINTENANCE: Status: ACTIVE | Noted: 2020-12-14

## 2021-06-21 PROBLEM — Z79.01 CURRENT USE OF LONG TERM ANTICOAGULATION: Status: ACTIVE | Noted: 2020-12-01

## 2021-06-21 PROBLEM — I48.0 PAROXYSMAL ATRIAL FIBRILLATION (HCC): Status: ACTIVE | Noted: 2018-12-03

## 2021-06-21 PROBLEM — R94.31 ABNORMAL ELECTROCARDIOGRAM: Status: ACTIVE | Noted: 2017-11-24

## 2021-06-21 PROBLEM — Z85.028 HISTORY OF CANCER OF STOMACH: Status: ACTIVE | Noted: 2021-06-21

## 2021-06-21 PROBLEM — R79.9 ABNORMAL BLOOD CHEMISTRY: Status: ACTIVE | Noted: 2021-01-04

## 2021-06-21 PROBLEM — S73.005S HIP DISLOCATION, LEFT, SEQUELA: Status: ACTIVE | Noted: 2018-12-03

## 2021-06-21 PROBLEM — I10 HYPERTENSION: Status: ACTIVE | Noted: 2021-06-21

## 2021-06-21 PROCEDURE — 99213 OFFICE O/P EST LOW 20 MIN: CPT | Performed by: FAMILY MEDICINE

## 2021-06-21 RX ORDER — AMOXICILLIN 500 MG/1
CAPSULE ORAL
COMMUNITY
Start: 2021-04-08

## 2021-06-21 RX ORDER — SOLIFENACIN SUCCINATE 5 MG/1
5 TABLET, FILM COATED ORAL DAILY
COMMUNITY
Start: 2021-04-06

## 2021-06-21 RX ORDER — APIXABAN 5 MG/1
TABLET, FILM COATED ORAL
COMMUNITY
Start: 2021-04-02

## 2021-06-21 NOTE — PATIENT INSTRUCTIONS
1  Allergic contact dermatitis  - apply topical 1% Hydrocortisone cream to the affected areas, not to be used for longer than 1 week   - discontinue the Arnicare cream    - gently wash the skin with soap and water daily and keep clean   - apply cool compresses to the affected areas  - advised against using oral Benadryl as it causes drowsiness and can the place the patient at a fall risk   - follow up w/ PCP for re-check in 2-3 days   - if symptoms acutely worsen or any new symptoms present patient is to be seen in the ER

## 2021-06-21 NOTE — PROGRESS NOTES
[facility]        NAME: Ed Stone is a 79 y o  female  : 1940    MRN: [id number]  DATE: 2021  TIME: 12:37 PM    Assessment and Plan   Allergic contact dermatitis, unspecified trigger [L23 9]  1  Allergic contact dermatitis, unspecified trigger           Patient Instructions     Patient Instructions   1  Allergic contact dermatitis  - apply topical 1% Hydrocortisone cream to the affected areas, not to be used for longer than 1 week   - discontinue the Arnicare cream    - gently wash the skin with soap and water daily and keep clean   - apply cool compresses to the affected areas  - advised against using oral Benadryl as it causes drowsiness and can the place the patient at a fall risk   - follow up w/ PCP for re-check in 2-3 days   - if symptoms acutely worsen or any new symptoms present patient is to be seen in the ER     Follow up with PCP in 2-3 days  Proceed to  ER if symptoms worsen  Chief Complaint     Chief Complaint   Patient presents with    Rash     rash on face         History of Present Illness       78 yo female presents for a rash on her face that has been present x 1 day  She states yesterday morning she used Arnicare cream on the left side of her neck and thinks she did not wash her hands and then possibly touched her face  She thinks the rash is an allergic reaction to the Arnicare cream  This was the first time she used it  There is a rash also present on the left side of the neck where she used the cream  She describes the rash as red, blotchy, and very itchy  No associated pain  No drainage or oozing from the rash  No fever/chills  No headache or body aches  No anaphylaxis symptoms  No chest pain or SOB  She denies any new foods  No new skin products used  No other new medications aside from the Arnicare cream  She has no known allergies  She has not attempted any treatment for the symptoms         Review of Systems   Review of Systems   Constitutional: Negative  HENT: Negative  Eyes: Negative  Respiratory: Negative  Cardiovascular: Negative  Gastrointestinal: Negative  Skin:        As noted in HPI   Allergic/Immunologic: Negative  Neurological: Negative  Hematological: Negative  Current Medications       Current Outpatient Medications:     metoprolol succinate (TOPROL-XL) 50 mg 24 hr tablet, Take 50 mg by mouth daily, Disp: , Rfl:     acetaminophen (TYLENOL) 500 mg tablet, Take 500 mg by mouth every 6 (six) hours as needed for mild pain, Disp: , Rfl:     amiodarone 200 mg tablet, 200 mg daily for 4 weeks then 100 mg daily (Patient not taking: Reported on 6/21/2021), Disp: , Rfl: 0    amoxicillin (AMOXIL) 500 mg capsule, TK FOUR CS PO 1 HOUR B DAPP, Disp: , Rfl:     aspirin (ECOTRIN LOW STRENGTH) 81 mg EC tablet, Take 1 tablet (81 mg total) by mouth 2 (two) times a day, Disp: , Rfl: 0    docusate sodium (COLACE) 100 mg capsule, Take 1 capsule (100 mg total) by mouth 2 (two) times a day, Disp: 10 capsule, Rfl: 0    Eliquis 5 MG, , Disp: , Rfl:     senna (SENOKOT) 8 6 mg, Take 1 tablet (8 6 mg total) by mouth daily at bedtime as needed for constipation, Disp: 120 each, Rfl: 0    solifenacin (VESICARE) 5 mg tablet, Take 5 mg by mouth daily, Disp: , Rfl:     Current Allergies     Allergies as of 06/21/2021    (No Known Allergies)            The following portions of the patient's history were reviewed and updated as appropriate: allergies, current medications, past family history, past medical history, past social history, past surgical history and problem list      Past Medical History:   Diagnosis Date    Arthritis     Cancer of stomach ([facility] ) 2013    s/p radiation     Hypertension     overactive bladder        Past Surgical History:   Procedure Laterality Date    APPENDECTOMY      MD PARTIAL HIP REPLACEMENT Left 11/3/2018    Procedure: HEMIARTHROPLASTY HIP (BIPOLAR);   Surgeon: Elijah Jurado MD;  Location: [address] [place] [address]; Service: Orthopedics    REPLACEMENT TOTAL KNEE Right 2016       Family History   Problem Relation Age of Onset    Cancer Maternal Grandfather          Medications have been verified  Objective   /80 (BP Location: Right arm, Patient Position: Sitting, Cuff Size: Standard)   Pulse 64   Temp 97 7 °F (36 5 °C) (Tympanic)   Resp 18   Ht 5' 4" (1 626 m)   Wt 78 9 kg (174 lb)   SpO2 100%   BMI 29 87 kg/m²   No LMP recorded  Patient is postmenopausal        Physical Exam     Physical Exam  Vitals and nursing note reviewed  Constitutional:       General: She is awake  She is not in acute distress  Appearance: Normal appearance  She is well-developed and well-groomed  She is not ill-appearing, toxic-appearing or diaphoretic  HENT:      Head: Normocephalic and atraumatic  Jaw: There is normal jaw occlusion  Right Ear: Tympanic membrane, ear canal and external ear normal       Left Ear: Tympanic membrane, ear canal and external ear normal       Nose: Nose normal       Mouth/Throat:      Lips: Pink  Mouth: Mucous membranes are moist       Pharynx: Oropharynx is clear  Uvula midline  Eyes:      General: Vision grossly intact  Gaze aligned appropriately  Extraocular Movements: Extraocular movements intact  Conjunctiva/sclera: Conjunctivae normal       Pupils: Pupils are equal, round, and reactive to light  Cardiovascular:      Rate and Rhythm: Normal rate and regular rhythm  Pulses: Normal pulses  Heart sounds: Normal heart sounds  Pulmonary:      Effort: Pulmonary effort is normal  No tachypnea, accessory muscle usage or respiratory distress  Breath sounds: Normal breath sounds and air entry  Skin:     General: Skin is warm and dry  Comments: There are scattered and blotchy erythematous patches present on the bilateral cheeks and the left side of the neck  No vesicles or pustules  No open wounds  No drainage or oozing  Non-tender to touch   No bruising or any signs of injury  Neurological:      Mental Status: She is alert and oriented to person, place, and time  Mental status is at baseline  Psychiatric:         Attention and Perception: Attention and perception normal          Mood and Affect: Mood and affect normal          Speech: Speech normal          Behavior: Behavior normal  Behavior is cooperative  Thought Content:  Thought content normal          Cognition and Memory: Cognition and memory normal          Judgment: Judgment normal

## 2021-09-16 ENCOUNTER — TELEPHONE (OUTPATIENT)
Dept: DERMATOLOGY | Facility: CLINIC | Age: 81
End: 2021-09-16

## 2023-01-15 NOTE — ASSESSMENT & PLAN NOTE
· Mechanical, was down 2 5 hrs  · Patient without any preceding symptoms  · OSH imaging including  · Left hip XR: fracutre of left femur  · Left knee XRL: severe OA which has progressed  Chondrocalcinosis suggesting CPPD  Probable bone infarcts within the distal left femur  · CXR:hyperinflated lungs   Probable scarring of left base  · Check BMP for renal function  · Check CK to r/o rhabdo Private car

## 2024-02-05 NOTE — PROGRESS NOTES
Progress Note - Yas Rodriguez 1940, 77 y o  female MRN: [id number]    Unit/Bed#: [address] [address] Encounter: [id number]    Primary Care Provider: No primary care provider on file  Date and time admitted to hospital: 11/2/2018  4:13 PM        * Fracture, hip, left, closed, initial encounter [facility])   Assessment & Plan    · S/p mechanical fall down the stairs  Patient transferred from [address] [place] [address] at her request to be operated on by Dr Jerardo Ricketts  · Left hip XR from OSH: Displaced, comminuted, subcapital fracture of the left femur  · S/p hip replacement, POD 2  · Post op DVT prophylaxis with ASA  · PT/OT ref STR, placement pending       PAF (paroxysmal atrial fibrillation) ([facility] [place] [facility] )   Assessment & Plan    · New onset, paroxysmal, noted on EKG prior to surgery  She was rate controlled at the time  Now back in NSR  · Patient seen by cardiology, cleared for OR  · Echo done, results as noted  · Per cardio start amiodarone p o  200 mg twice a day x 2 weeks then 200 mg daily x2 weeks then 100 mg daily  Fall down stairs   Assessment & Plan    · Mechanical, was down 2 5 hrs  · Patient without any preceding symptoms  · OSH imaging including  · Left hip XR: fracutre of left femur  · Left knee XRL: severe OA which has progressed  Chondrocalcinosis suggesting CPPD  Probable bone infarcts within the distal left femur  · CXR:hyperinflated lungs   Probable scarring of left base  · Creatinine and total CK wnl  · Post op PT/OT     Stomach cancer ([facility] [place] [facility] )   Assessment & Plan    · Hx of stomach ca s/p radiation in [facility] in 2013  · Has not had follow up since   · Advised to follow up with her oncologist once acute issues resolve     Overactive bladder   Assessment & Plan    · Substitute vesic are for formulary Ditropan     Essential hypertension   Assessment & Plan    · Pt on metoprolol  · BP elevated on arrival, may be 2/2 pain, now improved  · Cont metoprolol           VTE Pharmacologic Prophylaxis:   Pharmacologic: per ortho team  Mechanical VTE Prophylaxis in Place: Yes    Patient Centered Rounds: I have performed bedside rounds with nursing staff today  Discussions with Specialists or Other Care Team Provider: Yes    Education and Discussions with Family / Patient:Yes    Time Spent for Care: 30 minutes  More than 50% of total time spent on counseling and coordination of care as described above  Current Length of Stay: 3 day(s)    Current Patient Status: Inpatient     Discharge Plan: STR OK to DC once bed available  Code Status: Level 1 - Full Code      Subjective:   Feels tired today  Post op pain minimal  Walked with PT yesterday      Objective:       Vitals:   Temp (24hrs), Av 8 °F (37 1 °C), Min:98 4 °F (36 9 °C), Max:99 3 °F (37 4 °C)    Temp:  [98 4 °F (36 9 °C)-99 3 °F (37 4 °C)] 99 °F (37 2 °C)  HR:  [70-88] 88  Resp:  [18] 18  BP: (121-151)/(50-67) 128/50  SpO2:  [90 %-99 %] 90 %  Body mass index is 24 89 kg/m²  Input and Output Summary (last 24 hours): Intake/Output Summary (Last 24 hours) at 18 1101  Last data filed at 18 0955   Gross per 24 hour   Intake              240 ml   Output             1150 ml   Net             -910 ml       Physical Exam:     Physical Exam   Constitutional: She is oriented to person, place, and time  She appears well-developed  No distress  HENT:   Head: Normocephalic and atraumatic  Cardiovascular: Normal rate, regular rhythm and normal heart sounds  Pulmonary/Chest: Effort normal and breath sounds normal  No respiratory distress  She has no wheezes  She has no rales  Abdominal: Soft  Bowel sounds are normal  She exhibits no distension  There is no tenderness  There is no rebound and no guarding  Musculoskeletal: She exhibits no edema  Left hip with dressing C/D, compartment soft and warm  Minimal TTP   Neurological: She is alert and oriented to person, place, and time  Skin: Skin is warm and dry  Psychiatric: She has a normal mood and affect  Her behavior is normal    Nursing note and vitals reviewed  Additional Data:     Labs:      Results from last 7 days  Lab Units 11/05/18  0609   WBC Thousand/uL 6 50   HEMOGLOBIN g/dL 10 0*   HEMATOCRIT % 32 0*   PLATELETS Thousands/uL 86*       Results from last 7 days  Lab Units 11/05/18  0609   POTASSIUM mmol/L 4 2   CHLORIDE mmol/L 108   CO2 mmol/L 27   BUN mg/dL 19   CREATININE mg/dL 0 88   CALCIUM mg/dL 8 5       Results from last 7 days  Lab Units 11/02/18  1848   INR  0 99       * I Have Reviewed All Lab Data Listed Above  * Additional Pertinent Lab Tests Reviewed: All Labs Within Last 24 Hours Reviewed    Imaging:  Xr Chest Portable    Result Date: 11/2/2018  Narrative: CHEST INDICATION:   pre-op  COMPARISON:  None EXAM PERFORMED/VIEWS:  XR CHEST PORTABLE FINDINGS: Heart shadow appears unremarkable  Atherosclerotic vascular calcifications are noted  The lungs are clear  No pneumothorax or pleural effusion  Osseous structures appear within normal limits for patient age  Impression: No acute cardiopulmonary disease   Workstation performed: [id number]     Imaging Reports Reviewed by myself    Cultures:   Blood Culture: No results found for: BLOODCX  Urine Culture: No results found for: URINECX  Sputum Culture: No components found for: SPUTUMCX  Wound Culture: No results found for: WOUNDCULT    Last 24 Hours Medication List:     Current Facility-Administered Medications:  acetaminophen 650 mg Oral Q6H PRN Erma Pool MD   aluminum-magnesium hydroxide-simethicone 30 mL Oral Q6H PRN Kamran Overton PA-C   amiodarone 200 mg Oral Daily With 216 Rashi Joseph MD   aspirin 81 mg Oral BID Kamran Overton PA-C   docusate sodium 100 mg Oral BID Erma Pool MD   fentaNYL 25 mcg Intravenous Q5 Min PRN Iraida Metcalf MD   HYDROmorphone 1 mg Intravenous Q3H PRN Desmond Springer PA-C   magnesium hydroxide 30 mL Oral Daily PRN Erma Pool MD   metoprolol succinate 50 mg Oral Daily Erma Pool MD   ondansetron 4 mg Intravenous Q6H PRN Reynold Smith PA-C   ondansetron 4 mg Intravenous Once PRN Mary Kay Mcfarlane MD   oxybutynin 10 mg Oral Daily Caitlin Arias MD   oxyCODONE-acetaminophen 1 tablet Oral Q3H PRN Vadim Pool PA-C   oxyCODONE-acetaminophen 2 tablet Oral Q4H PRN Vadim Pool PA-C   senna 1 tablet Oral HS PRN Vadim Pool PA-C        Today, Patient Was Seen By: Caitlin Arias MD    ** Please Note: Dragon 360 Dictation voice to text software may have been used in the creation of this document   ** No

## (undated) DEVICE — PREP IM ENCHANCED TOTAL HIP BONE                                    PREPARATION KIT: Brand: PREP-IM

## (undated) DEVICE — HANDPIECE SET WITH HIGH FLOW TIP AND SUCTION TUBE: Brand: INTERPULSE

## (undated) DEVICE — GLOVE INDICATOR PI UNDERGLOVE SZ 7 BLUE

## (undated) DEVICE — Device

## (undated) DEVICE — GLOVE INDICATOR PI UNDERGLOVE SZ 8 BLUE

## (undated) DEVICE — 3M™ IOBAN™ 2 ANTIMICROBIAL INCISE DRAPE 6651EZ: Brand: IOBAN™ 2

## (undated) DEVICE — CHLORAPREP HI-LITE 26ML ORANGE

## (undated) DEVICE — NON-WOVEN ADHESIVE WOUND DRESSING: Brand: PRIMAPORE ADHESIVE DRESSING 30*10CM

## (undated) DEVICE — PROXIMATE PLUS MD MULTI-DIRECTIONAL RELEASE SKIN STAPLERS CONTAINS 35 STAINLESS STEEL STAPLES APPROXIMATE CLOSED DIMENSIONS: 6.9MM X 3.9MM WIDE: Brand: PROXIMATE

## (undated) DEVICE — SKIN STAPLER LOADING UNIT

## (undated) DEVICE — GLOVE SRG BIOGEL 7

## (undated) DEVICE — VORTEX VACUUM MIXING SYSTEM: Brand: VORTEX

## (undated) DEVICE — TIBURON SPLIT SHEET: Brand: CONVERTORS

## (undated) DEVICE — SUT ETHIBOND 5 V-37 30 IN MB66G

## (undated) DEVICE — BASIC DOUBLE BASIN 2-LF: Brand: MEDLINE INDUSTRIES, INC.

## (undated) DEVICE — HOOD: Brand: FLYTE, SURGICOOL

## (undated) DEVICE — GLOVE SRG BIOGEL M SRGS 7.5

## (undated) DEVICE — ASTOUND STANDARD SURGICAL GOWN, XL: Brand: CONVERTORS

## (undated) DEVICE — GLOVE SRG BIOGEL 8

## (undated) DEVICE — INTENDED FOR TISSUE SEPARATION, AND OTHER PROCEDURES THAT REQUIRE A SHARP SURGICAL BLADE TO PUNCTURE OR CUT.: Brand: BARD-PARKER SAFETY BLADES SIZE 10, STERILE

## (undated) DEVICE — ORTHOPEDIC PACK: Brand: CARDINAL HEALTH

## (undated) DEVICE — SAW BLADE, NARROW: Brand: ENDO-FUSE

## (undated) DEVICE — ABDUCTION PILLOW FOAM POSITIONER: Brand: CARDINAL HEALTH

## (undated) DEVICE — DRESSING MEPILEX BORDER 4 X 12 IN

## (undated) DEVICE — ANTIBACTERIAL VIOLET BRAIDED (POLYGLACTIN 910), SYNTHETIC ABSORBABLE SURGICAL SUTURE: Brand: COATED VICRYL

## (undated) DEVICE — SUT VICRYL 2-0 CT-1 27 IN J259H